# Patient Record
Sex: FEMALE | Race: WHITE | Employment: FULL TIME
[De-identification: names, ages, dates, MRNs, and addresses within clinical notes are randomized per-mention and may not be internally consistent; named-entity substitution may affect disease eponyms.]

---

## 2018-07-25 ENCOUNTER — RECORDS - HEALTHEAST (OUTPATIENT)
Dept: ADMINISTRATIVE | Facility: OTHER | Age: 43
End: 2018-07-25

## 2019-01-08 ENCOUNTER — RECORDS - HEALTHEAST (OUTPATIENT)
Dept: ADMINISTRATIVE | Facility: OTHER | Age: 44
End: 2019-01-08

## 2019-01-31 ENCOUNTER — RECORDS - HEALTHEAST (OUTPATIENT)
Dept: ADMINISTRATIVE | Facility: OTHER | Age: 44
End: 2019-01-31

## 2019-03-07 ENCOUNTER — RECORDS - HEALTHEAST (OUTPATIENT)
Dept: ADMINISTRATIVE | Facility: OTHER | Age: 44
End: 2019-03-07

## 2019-04-18 ENCOUNTER — RECORDS - HEALTHEAST (OUTPATIENT)
Dept: ADMINISTRATIVE | Facility: OTHER | Age: 44
End: 2019-04-18

## 2019-06-13 ENCOUNTER — RECORDS - HEALTHEAST (OUTPATIENT)
Dept: ADMINISTRATIVE | Facility: OTHER | Age: 44
End: 2019-06-13

## 2021-09-15 ENCOUNTER — APPOINTMENT (OUTPATIENT)
Dept: CT IMAGING | Facility: CLINIC | Age: 46
End: 2021-09-15
Attending: EMERGENCY MEDICINE
Payer: COMMERCIAL

## 2021-09-15 ENCOUNTER — HOSPITAL ENCOUNTER (OUTPATIENT)
Facility: CLINIC | Age: 46
Setting detail: OBSERVATION
Discharge: HOME | End: 2021-09-19
Attending: EMERGENCY MEDICINE | Admitting: STUDENT IN AN ORGANIZED HEALTH CARE EDUCATION/TRAINING PROGRAM
Payer: COMMERCIAL

## 2021-09-15 ENCOUNTER — APPOINTMENT (OUTPATIENT)
Dept: GENERAL RADIOLOGY | Facility: CLINIC | Age: 46
End: 2021-09-15
Attending: EMERGENCY MEDICINE
Payer: COMMERCIAL

## 2021-09-15 DIAGNOSIS — E87.20 LACTIC ACIDOSIS: ICD-10-CM

## 2021-09-15 DIAGNOSIS — R45.1 AGITATION: ICD-10-CM

## 2021-09-15 DIAGNOSIS — F29 PSYCHOSIS, UNSPECIFIED PSYCHOSIS TYPE (H): ICD-10-CM

## 2021-09-15 DIAGNOSIS — I25.9 MYOCARDIAL ISCHEMIA: ICD-10-CM

## 2021-09-15 DIAGNOSIS — E87.20 METABOLIC ACIDOSIS: ICD-10-CM

## 2021-09-15 DIAGNOSIS — R00.1 SINUS BRADYCARDIA: ICD-10-CM

## 2021-09-15 DIAGNOSIS — K72.00 SHOCK LIVER: Primary | ICD-10-CM

## 2021-09-15 LAB
ALBUMIN SERPL-MCNC: 4.1 G/DL (ref 3.4–5)
ALP SERPL-CCNC: 113 U/L (ref 40–150)
ALT SERPL W P-5'-P-CCNC: 79 U/L (ref 0–50)
AMPHETAMINES UR QL SCN: ABNORMAL
ANION GAP SERPL CALCULATED.3IONS-SCNC: 22 MMOL/L (ref 3–14)
AST SERPL W P-5'-P-CCNC: 142 U/L (ref 0–45)
ATRIAL RATE - MUSE: 137 BPM
ATRIAL RATE - MUSE: 91 BPM
BARBITURATES UR QL: ABNORMAL
BASOPHILS # BLD AUTO: 0.1 10E3/UL (ref 0–0.2)
BASOPHILS NFR BLD AUTO: 1 %
BENZODIAZ UR QL: ABNORMAL
BILIRUB SERPL-MCNC: 0.6 MG/DL (ref 0.2–1.3)
BUN SERPL-MCNC: 27 MG/DL (ref 7–30)
CALCIUM SERPL-MCNC: 9.5 MG/DL (ref 8.5–10.1)
CANNABINOIDS UR QL SCN: ABNORMAL
CHLORIDE BLD-SCNC: 108 MMOL/L (ref 94–109)
CK SERPL-CCNC: 106 U/L (ref 30–225)
CO2 SERPL-SCNC: 14 MMOL/L (ref 20–32)
COCAINE UR QL: ABNORMAL
CREAT SERPL-MCNC: 1.33 MG/DL (ref 0.52–1.04)
DIASTOLIC BLOOD PRESSURE - MUSE: NORMAL MMHG
DIASTOLIC BLOOD PRESSURE - MUSE: NORMAL MMHG
EOSINOPHIL # BLD AUTO: 0.1 10E3/UL (ref 0–0.7)
EOSINOPHIL NFR BLD AUTO: 1 %
ERYTHROCYTE [DISTWIDTH] IN BLOOD BY AUTOMATED COUNT: 13.6 % (ref 10–15)
ETHANOL SERPL-MCNC: <0.01 G/DL
GFR SERPL CREATININE-BSD FRML MDRD: 48 ML/MIN/1.73M2
GLUCOSE BLD-MCNC: 164 MG/DL (ref 70–99)
HCG SERPL QL: NEGATIVE
HCO3 BLDV-SCNC: 24 MMOL/L (ref 21–28)
HCT VFR BLD AUTO: 44.9 % (ref 35–47)
HGB BLD-MCNC: 14.4 G/DL (ref 11.7–15.7)
HOLD SPECIMEN: NORMAL
IMM GRANULOCYTES # BLD: 0.1 10E3/UL
IMM GRANULOCYTES NFR BLD: 1 %
INTERPRETATION ECG - MUSE: NORMAL
INTERPRETATION ECG - MUSE: NORMAL
LACTATE BLD-SCNC: 2.6 MMOL/L
LYMPHOCYTES # BLD AUTO: 4.3 10E3/UL (ref 0.8–5.3)
LYMPHOCYTES NFR BLD AUTO: 53 %
MAGNESIUM SERPL-MCNC: 2 MG/DL (ref 1.6–2.3)
MCH RBC QN AUTO: 32.9 PG (ref 26.5–33)
MCHC RBC AUTO-ENTMCNC: 32.1 G/DL (ref 31.5–36.5)
MCV RBC AUTO: 103 FL (ref 78–100)
MONOCYTES # BLD AUTO: 0.5 10E3/UL (ref 0–1.3)
MONOCYTES NFR BLD AUTO: 6 %
NEUTROPHILS # BLD AUTO: 3.1 10E3/UL (ref 1.6–8.3)
NEUTROPHILS NFR BLD AUTO: 38 %
NRBC # BLD AUTO: 0 10E3/UL
NRBC BLD AUTO-RTO: 0 /100
OPIATES UR QL SCN: ABNORMAL
P AXIS - MUSE: 61 DEGREES
P AXIS - MUSE: NORMAL DEGREES
PCO2 BLDV: 43 MM HG (ref 40–50)
PCP UR QL SCN: ABNORMAL
PH BLDV: 7.34 [PH] (ref 7.32–7.43)
PLATELET # BLD AUTO: 234 10E3/UL (ref 150–450)
PO2 BLDV: 39 MM HG (ref 25–47)
POTASSIUM BLD-SCNC: 3.5 MMOL/L (ref 3.4–5.3)
PR INTERVAL - MUSE: 120 MS
PR INTERVAL - MUSE: 168 MS
PROT SERPL-MCNC: 7.9 G/DL (ref 6.8–8.8)
QRS DURATION - MUSE: 88 MS
QRS DURATION - MUSE: 96 MS
QT - MUSE: 364 MS
QT - MUSE: 364 MS
QTC - MUSE: 447 MS
QTC - MUSE: 549 MS
R AXIS - MUSE: 67 DEGREES
R AXIS - MUSE: 84 DEGREES
RBC # BLD AUTO: 4.38 10E6/UL (ref 3.8–5.2)
SAO2 % BLDV: 70 % (ref 94–100)
SARS-COV-2 RNA RESP QL NAA+PROBE: NEGATIVE
SODIUM SERPL-SCNC: 144 MMOL/L (ref 133–144)
SYSTOLIC BLOOD PRESSURE - MUSE: NORMAL MMHG
SYSTOLIC BLOOD PRESSURE - MUSE: NORMAL MMHG
T AXIS - MUSE: 33 DEGREES
T AXIS - MUSE: 77 DEGREES
T4 FREE SERPL-MCNC: 0.71 NG/DL (ref 0.76–1.46)
TROPONIN I SERPL-MCNC: 0.17 UG/L (ref 0–0.04)
TSH SERPL DL<=0.005 MIU/L-ACNC: 28.28 MU/L (ref 0.4–4)
VENTRICULAR RATE- MUSE: 137 BPM
VENTRICULAR RATE- MUSE: 91 BPM
WBC # BLD AUTO: 8.2 10E3/UL (ref 4–11)

## 2021-09-15 PROCEDURE — 36415 COLL VENOUS BLD VENIPUNCTURE: CPT | Performed by: EMERGENCY MEDICINE

## 2021-09-15 PROCEDURE — C9803 HOPD COVID-19 SPEC COLLECT: HCPCS

## 2021-09-15 PROCEDURE — 93005 ELECTROCARDIOGRAM TRACING: CPT | Mod: 76

## 2021-09-15 PROCEDURE — 82040 ASSAY OF SERUM ALBUMIN: CPT | Performed by: EMERGENCY MEDICINE

## 2021-09-15 PROCEDURE — 83735 ASSAY OF MAGNESIUM: CPT | Performed by: EMERGENCY MEDICINE

## 2021-09-15 PROCEDURE — 87635 SARS-COV-2 COVID-19 AMP PRB: CPT | Performed by: EMERGENCY MEDICINE

## 2021-09-15 PROCEDURE — 80307 DRUG TEST PRSMV CHEM ANLYZR: CPT | Performed by: EMERGENCY MEDICINE

## 2021-09-15 PROCEDURE — 250N000009 HC RX 250

## 2021-09-15 PROCEDURE — 84443 ASSAY THYROID STIM HORMONE: CPT | Performed by: EMERGENCY MEDICINE

## 2021-09-15 PROCEDURE — 82077 ASSAY SPEC XCP UR&BREATH IA: CPT | Performed by: EMERGENCY MEDICINE

## 2021-09-15 PROCEDURE — 250N000013 HC RX MED GY IP 250 OP 250 PS 637: Performed by: EMERGENCY MEDICINE

## 2021-09-15 PROCEDURE — 70450 CT HEAD/BRAIN W/O DYE: CPT

## 2021-09-15 PROCEDURE — 258N000003 HC RX IP 258 OP 636: Performed by: EMERGENCY MEDICINE

## 2021-09-15 PROCEDURE — 99285 EMERGENCY DEPT VISIT HI MDM: CPT | Mod: 25

## 2021-09-15 PROCEDURE — 250N000011 HC RX IP 250 OP 636: Performed by: EMERGENCY MEDICINE

## 2021-09-15 PROCEDURE — 71045 X-RAY EXAM CHEST 1 VIEW: CPT

## 2021-09-15 PROCEDURE — 96372 THER/PROPH/DIAG INJ SC/IM: CPT

## 2021-09-15 PROCEDURE — 84484 ASSAY OF TROPONIN QUANT: CPT | Performed by: EMERGENCY MEDICINE

## 2021-09-15 PROCEDURE — 84439 ASSAY OF FREE THYROXINE: CPT | Performed by: EMERGENCY MEDICINE

## 2021-09-15 PROCEDURE — 250N000011 HC RX IP 250 OP 636

## 2021-09-15 PROCEDURE — 82803 BLOOD GASES ANY COMBINATION: CPT

## 2021-09-15 PROCEDURE — 84703 CHORIONIC GONADOTROPIN ASSAY: CPT | Performed by: EMERGENCY MEDICINE

## 2021-09-15 PROCEDURE — 85025 COMPLETE CBC W/AUTO DIFF WBC: CPT | Performed by: EMERGENCY MEDICINE

## 2021-09-15 PROCEDURE — 96361 HYDRATE IV INFUSION ADD-ON: CPT

## 2021-09-15 PROCEDURE — 82550 ASSAY OF CK (CPK): CPT | Performed by: EMERGENCY MEDICINE

## 2021-09-15 PROCEDURE — G0378 HOSPITAL OBSERVATION PER HR: HCPCS

## 2021-09-15 PROCEDURE — 93005 ELECTROCARDIOGRAM TRACING: CPT

## 2021-09-15 PROCEDURE — 96374 THER/PROPH/DIAG INJ IV PUSH: CPT

## 2021-09-15 PROCEDURE — 96375 TX/PRO/DX INJ NEW DRUG ADDON: CPT

## 2021-09-15 RX ORDER — WATER 10 ML/10ML
INJECTION INTRAMUSCULAR; INTRAVENOUS; SUBCUTANEOUS
Status: COMPLETED
Start: 2021-09-15 | End: 2021-09-15

## 2021-09-15 RX ORDER — OLANZAPINE 10 MG/2ML
INJECTION, POWDER, FOR SOLUTION INTRAMUSCULAR
Status: COMPLETED
Start: 2021-09-15 | End: 2021-09-15

## 2021-09-15 RX ORDER — OLANZAPINE 10 MG/2ML
10 INJECTION, POWDER, FOR SOLUTION INTRAMUSCULAR DAILY PRN
Status: DISCONTINUED | OUTPATIENT
Start: 2021-09-15 | End: 2021-09-19 | Stop reason: HOSPADM

## 2021-09-15 RX ORDER — LOSARTAN POTASSIUM 25 MG/1
25 TABLET ORAL DAILY
COMMUNITY
End: 2021-10-18

## 2021-09-15 RX ORDER — SODIUM CHLORIDE 9 MG/ML
INJECTION, SOLUTION INTRAVENOUS CONTINUOUS
Status: DISCONTINUED | OUTPATIENT
Start: 2021-09-15 | End: 2021-09-17

## 2021-09-15 RX ORDER — ASPIRIN 81 MG/1
324 TABLET, CHEWABLE ORAL ONCE
Status: COMPLETED | OUTPATIENT
Start: 2021-09-15 | End: 2021-09-15

## 2021-09-15 RX ORDER — LEVOTHYROXINE SODIUM 75 UG/1
75 TABLET ORAL DAILY
COMMUNITY

## 2021-09-15 RX ORDER — PROGESTERONE 100 MG/1
100 CAPSULE ORAL EVERY EVENING
COMMUNITY

## 2021-09-15 RX ORDER — ONDANSETRON 2 MG/ML
4 INJECTION INTRAMUSCULAR; INTRAVENOUS ONCE
Status: COMPLETED | OUTPATIENT
Start: 2021-09-15 | End: 2021-09-15

## 2021-09-15 RX ORDER — MAGNESIUM SULFATE HEPTAHYDRATE 40 MG/ML
2 INJECTION, SOLUTION INTRAVENOUS ONCE
Status: DISCONTINUED | OUTPATIENT
Start: 2021-09-15 | End: 2021-09-17

## 2021-09-15 RX ORDER — CHLORAL HYDRATE 500 MG
2 CAPSULE ORAL DAILY
COMMUNITY

## 2021-09-15 RX ADMIN — OLANZAPINE 10 MG: 10 INJECTION, POWDER, FOR SOLUTION INTRAMUSCULAR at 20:22

## 2021-09-15 RX ADMIN — SODIUM CHLORIDE 1000 ML: 9 INJECTION, SOLUTION INTRAVENOUS at 21:02

## 2021-09-15 RX ADMIN — SODIUM CHLORIDE 1000 ML: 9 INJECTION, SOLUTION INTRAVENOUS at 20:36

## 2021-09-15 RX ADMIN — MIDAZOLAM HYDROCHLORIDE 2 MG: 1 INJECTION, SOLUTION INTRAMUSCULAR; INTRAVENOUS at 20:25

## 2021-09-15 RX ADMIN — ONDANSETRON 4 MG: 2 INJECTION INTRAMUSCULAR; INTRAVENOUS at 23:50

## 2021-09-15 RX ADMIN — ASPIRIN 81 MG CHEWABLE TABLET 324 MG: 81 TABLET CHEWABLE at 23:46

## 2021-09-15 RX ADMIN — WATER 10 ML: 1 INJECTION INTRAMUSCULAR; INTRAVENOUS; SUBCUTANEOUS at 20:24

## 2021-09-15 ASSESSMENT — MIFFLIN-ST. JEOR: SCORE: 1230.56

## 2021-09-16 ENCOUNTER — APPOINTMENT (OUTPATIENT)
Dept: CARDIOLOGY | Facility: CLINIC | Age: 46
End: 2021-09-16
Attending: INTERNAL MEDICINE
Payer: COMMERCIAL

## 2021-09-16 PROBLEM — E34.9 HORMONE IMBALANCE: Status: ACTIVE | Noted: 2018-03-08

## 2021-09-16 LAB
ANION GAP SERPL CALCULATED.3IONS-SCNC: 9 MMOL/L (ref 3–14)
BUN SERPL-MCNC: 26 MG/DL (ref 7–30)
CALCIUM SERPL-MCNC: 7.6 MG/DL (ref 8.5–10.1)
CHLORIDE BLD-SCNC: 111 MMOL/L (ref 94–109)
CO2 SERPL-SCNC: 21 MMOL/L (ref 20–32)
CREAT SERPL-MCNC: 1.25 MG/DL (ref 0.52–1.04)
ERYTHROCYTE [DISTWIDTH] IN BLOOD BY AUTOMATED COUNT: 13.9 % (ref 10–15)
GFR SERPL CREATININE-BSD FRML MDRD: 52 ML/MIN/1.73M2
GLUCOSE BLD-MCNC: 80 MG/DL (ref 70–99)
HCT VFR BLD AUTO: 37.8 % (ref 35–47)
HGB BLD-MCNC: 12.9 G/DL (ref 11.7–15.7)
LVEF ECHO: NORMAL
MCH RBC QN AUTO: 33 PG (ref 26.5–33)
MCHC RBC AUTO-ENTMCNC: 34.1 G/DL (ref 31.5–36.5)
MCV RBC AUTO: 97 FL (ref 78–100)
PLATELET # BLD AUTO: 151 10E3/UL (ref 150–450)
POTASSIUM BLD-SCNC: 3.4 MMOL/L (ref 3.4–5.3)
RBC # BLD AUTO: 3.91 10E6/UL (ref 3.8–5.2)
SODIUM SERPL-SCNC: 141 MMOL/L (ref 133–144)
TROPONIN I SERPL-MCNC: 0.38 UG/L (ref 0–0.04)
TROPONIN I SERPL-MCNC: 0.51 UG/L (ref 0–0.04)
WBC # BLD AUTO: 11.6 10E3/UL (ref 4–11)

## 2021-09-16 PROCEDURE — 36415 COLL VENOUS BLD VENIPUNCTURE: CPT | Performed by: STUDENT IN AN ORGANIZED HEALTH CARE EDUCATION/TRAINING PROGRAM

## 2021-09-16 PROCEDURE — 93306 TTE W/DOPPLER COMPLETE: CPT

## 2021-09-16 PROCEDURE — 36415 COLL VENOUS BLD VENIPUNCTURE: CPT | Performed by: INTERNAL MEDICINE

## 2021-09-16 PROCEDURE — 85027 COMPLETE CBC AUTOMATED: CPT | Performed by: STUDENT IN AN ORGANIZED HEALTH CARE EDUCATION/TRAINING PROGRAM

## 2021-09-16 PROCEDURE — G0378 HOSPITAL OBSERVATION PER HR: HCPCS

## 2021-09-16 PROCEDURE — 84484 ASSAY OF TROPONIN QUANT: CPT | Performed by: STUDENT IN AN ORGANIZED HEALTH CARE EDUCATION/TRAINING PROGRAM

## 2021-09-16 PROCEDURE — 80048 BASIC METABOLIC PNL TOTAL CA: CPT | Performed by: STUDENT IN AN ORGANIZED HEALTH CARE EDUCATION/TRAINING PROGRAM

## 2021-09-16 PROCEDURE — 99204 OFFICE O/P NEW MOD 45 MIN: CPT | Mod: 25 | Performed by: INTERNAL MEDICINE

## 2021-09-16 PROCEDURE — 84484 ASSAY OF TROPONIN QUANT: CPT | Performed by: INTERNAL MEDICINE

## 2021-09-16 PROCEDURE — 96361 HYDRATE IV INFUSION ADD-ON: CPT

## 2021-09-16 PROCEDURE — 82248 BILIRUBIN DIRECT: CPT | Performed by: INTERNAL MEDICINE

## 2021-09-16 PROCEDURE — 99233 SBSQ HOSP IP/OBS HIGH 50: CPT | Performed by: INTERNAL MEDICINE

## 2021-09-16 PROCEDURE — 258N000003 HC RX IP 258 OP 636: Performed by: STUDENT IN AN ORGANIZED HEALTH CARE EDUCATION/TRAINING PROGRAM

## 2021-09-16 PROCEDURE — 93306 TTE W/DOPPLER COMPLETE: CPT | Mod: 26 | Performed by: INTERNAL MEDICINE

## 2021-09-16 PROCEDURE — 250N000013 HC RX MED GY IP 250 OP 250 PS 637: Performed by: STUDENT IN AN ORGANIZED HEALTH CARE EDUCATION/TRAINING PROGRAM

## 2021-09-16 PROCEDURE — 99220 PR INITIAL OBSERVATION CARE,LEVEL III: CPT | Performed by: STUDENT IN AN ORGANIZED HEALTH CARE EDUCATION/TRAINING PROGRAM

## 2021-09-16 RX ORDER — LEVOTHYROXINE SODIUM 75 UG/1
75 TABLET ORAL DAILY
Status: DISCONTINUED | OUTPATIENT
Start: 2021-09-16 | End: 2021-09-19 | Stop reason: HOSPADM

## 2021-09-16 RX ORDER — LIDOCAINE 40 MG/G
CREAM TOPICAL
Status: DISCONTINUED | OUTPATIENT
Start: 2021-09-16 | End: 2021-09-19 | Stop reason: HOSPADM

## 2021-09-16 RX ORDER — ONDANSETRON 4 MG/1
4 TABLET, ORALLY DISINTEGRATING ORAL EVERY 6 HOURS PRN
Status: DISCONTINUED | OUTPATIENT
Start: 2021-09-16 | End: 2021-09-19 | Stop reason: HOSPADM

## 2021-09-16 RX ORDER — QUETIAPINE FUMARATE 25 MG/1
25 TABLET, FILM COATED ORAL 2 TIMES DAILY PRN
Status: DISCONTINUED | OUTPATIENT
Start: 2021-09-16 | End: 2021-09-19 | Stop reason: HOSPADM

## 2021-09-16 RX ORDER — ONDANSETRON 2 MG/ML
4 INJECTION INTRAMUSCULAR; INTRAVENOUS EVERY 6 HOURS PRN
Status: DISCONTINUED | OUTPATIENT
Start: 2021-09-16 | End: 2021-09-19 | Stop reason: HOSPADM

## 2021-09-16 RX ORDER — SODIUM CHLORIDE, SODIUM LACTATE, POTASSIUM CHLORIDE, CALCIUM CHLORIDE 600; 310; 30; 20 MG/100ML; MG/100ML; MG/100ML; MG/100ML
INJECTION, SOLUTION INTRAVENOUS CONTINUOUS
Status: DISCONTINUED | OUTPATIENT
Start: 2021-09-16 | End: 2021-09-19

## 2021-09-16 RX ADMIN — SODIUM CHLORIDE, POTASSIUM CHLORIDE, SODIUM LACTATE AND CALCIUM CHLORIDE: 600; 310; 30; 20 INJECTION, SOLUTION INTRAVENOUS at 10:45

## 2021-09-16 RX ADMIN — LEVOTHYROXINE SODIUM 75 MCG: 75 TABLET ORAL at 12:42

## 2021-09-16 RX ADMIN — SODIUM CHLORIDE, POTASSIUM CHLORIDE, SODIUM LACTATE AND CALCIUM CHLORIDE: 600; 310; 30; 20 INJECTION, SOLUTION INTRAVENOUS at 20:39

## 2021-09-16 ASSESSMENT — MIFFLIN-ST. JEOR: SCORE: 1257.77

## 2021-09-16 NOTE — PHARMACY-ADMISSION MEDICATION HISTORY
Pharmacy Medication History  Admission medication history interview status for the 9/15/2021  admission is complete. See EPIC admission navigator for prior to admission medications     Location of Interview: Patient room  Medication history sources: Patient and Surescripts    Significant changes made to the medication list:  1) List was fully updated    Medication reconciliation completed by provider prior to medication history? No    Time spent in this activity: 10 minutes    Prior to Admission medications    Medication Sig Last Dose Taking? Auth Provider   calcium-magnesium (CALMAG) 500-250 MG TABS per tablet Take 1 tablet by mouth every evening 9/14/2021 at pm Yes Unknown, Entered By History   fish oil-omega-3 fatty acids 1000 MG capsule Take 2 g by mouth daily 9/15/2021 at am Yes Unknown, Entered By History   levothyroxine (SYNTHROID/LEVOTHROID) 75 MCG tablet Take 75 mcg by mouth daily 9/15/2021 at Unknown time Yes Unknown, Entered By History   progesterone (PROMETRIUM) 100 MG capsule Take 100 mg by mouth every evening 9/14/2021 at pm Yes Unknown, Entered By History   Salicylic Acid (COMPOUND W EX) Compounded testosterone 4g behind the knees every morning. 9/15/2021 at am Yes Unknown, Entered By History   vitamin B complex with vitamin C (VITAMIN  B COMPLEX) tablet Take 1 tablet by mouth daily 9/15/2021 at am Yes Unknown, Entered By History   losartan (COZAAR) 25 MG tablet Take 25 mg by mouth daily has not started yet  Unknown, Entered By History       The information provided in this note is only as accurate as the sources available at the time of update(s)

## 2021-09-16 NOTE — CONSULTS
"CARDIOLOGY CONSULT    REASON FOR CONSULT: elevated troponin    PRIMARY CARE PHYSICIAN:  [facility]    HISTORY OF PRESENT ILLNESS:  Ms. Palacios is a 46 y/o woman with PMH significant for Hashimotos thyroiditis, asthma who was admitted following an episode of altered mental status and collapse at the end of a 4 mile trail run.  Suzna states she exercises regularly (she is a  and ) and enjoys running marathons.  Yesterday, she ran a 4 mile trail race at 6 pm.  She states she felt well throughout the day and felt she was eating and drinking per her usual.  Towards the end of her race, she was noted to be stumbling and talking \"gibberish\".  She was helped by two other men at the race to carry her although she was disappointed that they did not help her across the finish line.  She states she has no memory of running towards the finish line and the first thing she recalls is being inside the ambulance. It is not clear that she actually lost consciousness, she did not fall.  She was then calm in the rig, however, upon arriving to the ED she reportedly was screaming things that were nonsensical and she required zyprexa, versed and restrains.  She was initially very hypertensive which improved without intervention.  Suzan states she recalls verbalizing in the ED as she felt like she was dying and saw a light.  It was a frightening experience for her.    In the ED, ECG was unremarkable.  Renal function demonstrated some mild KYLAH, lactate mildly elevated.  Troponin was elevated to 0.5 and downtrending.  There has been no arrhythmias on telemetry.  Suzan reports feeling well currently.  She notes increased stress in her life as her mother  of pancreatic cancer in July and she and her sisters are facing some financial issues related to this.  She admits to drinking 4-6 vodka drinks nightly and has been doing this \"for a long time\", not changed or increased recently.  "         PAST MEDICAL HISTORY:  1.  Hashitmoto's thyroiditis  2.  IBS  3.  Asthma      MEDICATIONS:  Current Facility-Administered Medications   Medication     lactated ringers infusion     levothyroxine (SYNTHROID/LEVOTHROID) tablet 75 mcg     lidocaine (LMX4) cream     lidocaine 1 % 0.1-1 mL     magnesium sulfate 2 g in water intermittent infusion     melatonin tablet 1 mg     OLANZapine (zyPREXA) injection 10 mg     ondansetron (ZOFRAN-ODT) ODT tab 4 mg    Or     ondansetron (ZOFRAN) injection 4 mg     QUEtiapine (SEROquel) tablet 25 mg     sodium chloride (PF) 0.9% PF flush 3 mL     sodium chloride (PF) 0.9% PF flush 3 mL     sodium chloride 0.9% infusion       ALLERGIES:  Allergies   Allergen Reactions     Flu Virus Vaccine Anaphylaxis     Contrast Dye      PN: LW CM1: CONTRAST- nka Reaction :     Other Environmental Allergy      PN: LW Other1: -nka       SOCIAL HISTORY:  Works as a /.  Nonsmoker.  Drinks 4-6 vodkas nightly.        FAMILY HISTORY:  I have reviewed this patient's family history and updated it with pertinent information if needed.   Family History   Problem Relation Age of Onset     No Known Problems Mother      No Known Problems Father        REVIEW OF SYSTEMS:  A complete ROS was obtained and the pertinent positives are outlined in the history of present illness above. The remainder of systems is negative.      PHYSICAL EXAM:  Temp: 97.7  F (36.5  C) Temp src: Oral BP: 122/69 Pulse: 54   Resp: 20 SpO2: 100 % O2 Device: None (Room air) Oxygen Delivery: 10 LPM  Vital Signs with Ranges  Temp:  [97.7  F (36.5  C)-98.5  F (36.9  C)] 97.7  F (36.5  C)  Pulse:  [] 54  Resp:  [11-42] 20  BP: ()/() 122/69  SpO2:  [92 %-100 %] 100 %  136 lbs 0 oz    Constitutional: awake, alert, no distress  Eyes: PERRL, sclera nonicteric  ENT: trachea midline  Respiratory: CTAB  Cardiovascular: RRR no m/r/g, no JVD  GI: nondistended, nontender, bowel sounds  present  Lymph/Hematologic: no lymphadenopathy  Skin: dry, no rash  Musculoskeletal: good muscle tone, no edema bilaterally  Neurologic: no focal deficits  Neuropsychiatric: appropriate affact    DATA:  ECG: Sinus rhythm, no significant ST segment changes    Labs:  Troponin 0.165-0.508 and downtrending  Creatinine 1.33           Echocardiogram 9/16/21   The visual ejection fraction is 55-60%.  Left ventricular systolic function is normal.  The study was technically difficult.        ASSESSMENT:  1.  Presyncope/syncope:  Not clearly evident that she had evans syncope.  Suspect this is related to dehydration as supported by lab work.  ECG and telemetry unremarkable to suggest underlying arrhythmia.    2.  Troponin elevation:  Likely demand ischemia in the setting of hemodynamic stress  3.  Alcohol abuse:  Drinking 4-6 vodka beverages nightly  4.  Increased life stressors    RECOMMENDATIONS:  1.  Agree with IV fluids  2.  Continue to monitor on telemetry overnight.    3.  Plan for exercise stress echocardiogram given presyncope/syncope with exercise to exclude underlying coronary artery disease  4.  If no significant findings, anticipate discharge tomorrow with outpatient event monitor.  5.  Strongly encouraged patient to cut back/refrain from ETOH.     Dione Zhong MD [facility]  Cardiology - [facility]  September 16, 2021

## 2021-09-16 NOTE — ED NOTES
A code 21 was called as this patient suddenly became very agitated and started screaming. Patient was not redirectable, had pressured speech, was tachycardic (160s), and did not respond to family presence and intervention. Zyprexa and Midazolam administration

## 2021-09-16 NOTE — ED NOTES
"[facility]  ED Nurse Handoff Report    ED Chief complaint: Syncope      ED Diagnosis:   Final diagnoses:   Psychosis, unspecified psychosis type (H)   Myocardial ischemia   Agitation   Metabolic acidosis   Lactic acidosis       Code Status: Full Code    Allergies: Not on File    Patient Story: Pt arrives after witnessed syncopal event during race. Upon arrival pt suddenly became violent, screaming \"fucking tell me not to die, I saw the mother fucking light and its true!\" amongst other illogical statements. Pt initailly restrained, given 10mg IM Zyprexa, 2mg IV Versed. Restraints removed  Focused Assessment:    Abnormal Labs Reviewed   COMPREHENSIVE METABOLIC PANEL - Abnormal; Notable for the following components:       Result Value    Carbon Dioxide (CO2) 14 (*)     Anion Gap 22 (*)     Creatinine 1.33 (*)     Glucose 164 (*)      (*)     ALT 79 (*)     GFR Estimate 48 (*)     All other components within normal limits   TROPONIN I - Abnormal; Notable for the following components:    Troponin I 0.165 (*)     All other components within normal limits   TSH WITH FREE T4 REFLEX - Abnormal; Notable for the following components:    TSH 28.28 (*)     All other components within normal limits   CBC WITH PLATELETS AND DIFFERENTIAL - Abnormal; Notable for the following components:     (*)     Absolute Immature Granulocytes 0.1 (*)     All other components within normal limits   T4 FREE - Abnormal; Notable for the following components:    Free T4 0.71 (*)     All other components within normal limits   DRUG ABUSE SCREEN 77 URINE (FL, RH, SH) - Abnormal; Notable for the following components:    Benzodiazepines Urine Screen Positive (*)     All other components within normal limits   ISTAT GASES LACTATE VENOUS POCT - Abnormal; Notable for the following components:    Lactic Acid POCT 2.6 (*)     O2 Sat, Venous POCT 70 (*)     All other components within normal limits     Head CT w/o contrast "   Final Result   IMPRESSION:   1.  Normal head CT.      XR Chest Port 1 View    (Results Pending)       Treatments and/or interventions provided: Zyprexa, Versed, Asprin, IVF  Patient's response to treatments and/or interventions: Calmer    To be done/followed up on inpatient unit:  See inpatient orders    Does this patient have any cognitive concerns?: A&O    Activity level - Baseline/Home:  Independent  Activity Level - Current:   Stand with assist x2    Patient's Preferred language: English   Needed?: No    Isolation: None  Infection: Not Applicable  Patient tested for COVID 19 prior to admission: YES  Bariatric?: No    Vital Signs:   Vitals:    09/15/21 2330 09/15/21 2345 09/16/21 0000 09/16/21 0015   BP: 114/67 115/77 114/74 115/70   Pulse: 91 97 98 92   Resp:  28 11 24   Temp:       TempSrc:       SpO2: 96% 95% 94% 94%   Weight:       Height:           Cardiac Rhythm:     Was the PSS-3 completed:   Yes  What interventions are required if any?               Family Comments:  at bedside  OBS brochure/video discussed/provided to patient/family: Yes           For the majority of the shift this patient's behavior was Green.   Behavioral interventions performed were None.    ED NURSE PHONE NUMBER: [phone]

## 2021-09-16 NOTE — PROGRESS NOTES
RECEIVING UNIT ED HANDOFF REVIEW    ED Nurse Handoff Report was reviewed by: Alyson Roman RN on September 16, 2021 at 11:15 AM

## 2021-09-16 NOTE — PLAN OF CARE
Pt A/Ox4. VSS on RA. Denies chest pain, lightheadedness, dizziness. TELE SR. Up independently.Tolerating regular diet. IV fluids infusing per orders.Trops trending down from 0.508 to 0.382. ECHO results pending. Cardiology consult following.

## 2021-09-16 NOTE — ED PROVIDER NOTES
"  History   Chief Complaint:  Syncope     The history is provided by the spouse and a relative. The history is limited by the condition of the patient.      Arlet Palacios is a 45 year old female who presents with syncope. The patients son tells us that the patient was running a four mile rail race but shortly before the finish she collapsed and was uttering \"nonsense and gibberish.\" She was then transported to the ED via EMS and was calm. Upon arriving to the ED the patient began screaming \"I saw the light, it's fucking true what they tell you, its true, its true.\" The patients  and son tells us that she is a very healthy person, exercises regularly, takes many supplements but eats minimal food throughout the day; for example, today she only ate two small pieces of chicken.     To note, the patients son was with her before the race and says that she was completely normal.     Review of Systems   Unable to perform ROS: Mental status change     Allergies:  Flu Virus vaccine    Medications:  Albuterol   Levothyroxine  Prometrium     Past Medical History:    Hormone imbalance  Hypothyroid  Dysplasia    IBS  UTI  Thyroid disease  Asthma    Past Surgical History:    C section   Laparoscopy   Tonsillectomy  [place] Lin teeth extraction   Laser Retinopexy - Bilateral     Family History:    Cataract  Diabetes  Multiple sclerosis  Thyroid disorder  Hypertension   Hypercholesterolemia    Social History:  The patient presents to the ED with her son and   The patient enjoys exercise, specifically running   The patient works at a gym   The patient endorses alcohol use     Physical Exam     Patient Vitals for the past 24 hrs:   BP Temp Temp src Pulse Resp SpO2 Height Weight   09/15/21 2130 (!) 142/81 -- -- 104 16 100 % -- --   09/15/21 2115 116/70 -- -- 84 21 100 % -- --   09/15/21 2100 118/84 -- -- 93 29 100 % -- --   09/15/21 2045 (!) 146/96 -- -- (!) 141 25 100 % -- --   09/15/21 2030 121/71 -- -- 116 (!) 42 99 % -- " "--   09/15/21 2029 118/67 98.5  F (36.9  C) Temporal 113 24 98 % 1.651 m (5' 5\") 59 kg (130 lb)   09/15/21 2025 118/67 -- -- (!) 160 -- 92 % -- --   09/15/21 2020 (!) 208/155 -- -- (!) 152 -- -- -- --   09/15/21 2015 -- -- -- -- -- 95 % -- --       Physical Exam  VS: Reviewed per above  HENT: Mucous membranes moist  EYES: sclera anicteric  CV: Rate as noted,  regular rhythm.   RESP: Effort normal. Breath sounds are normal bilaterally.  GI: no tenderness/rebound/guarding, not distended.  NEURO: Alert, moving all extremities, no clonus, psychomotor agitation  MSK: No deformity of the extremities  SKIN: Warm and dry    Emergency Department Course   ECG #1:  ECG taken at 2026, ECG read at 2033  Sinus tachycardia. Nonspecific ST abnormality. Abnormal ECG.  Rate 137 bpm. AK interval 120 ms. QRS duration 88 ms. QT/QTc 364/549 ms. P-R-T axes * 84 77.     ECG #2:  ECG taken at 2207, ECG read at 2220  Normal sinus rhythm. Incomplete right bundle branch block. Cannot rule out Anterior infarct, age undermined. Abnormal ECG. No longer tachycardic.   Rate 91 bpm. AK interval 168 ms. QRS duration 96 ms. QT/QTc 364/447 ms. P-R-T axes 61 67 33.     Imaging:    XR Chest Port 1 View   Final Result   IMPRESSION: Negative chest.      Head CT w/o contrast   Final Result   IMPRESSION:   1.  Normal head CT.            Laboratory:     CBC: WBC 8.2, HGB 14.4,    CMP: CO2 14(L); Anion Gap 22(H); Creatinine 1.33(H); Glucose 164(H); (H); ALT 79(H); GFR 48(L) o/w WNL   Troponin (Collected 2041): 0.165(H)  HCG Qualitative Pregnancy: Negative   Magnesium: 2.0  TSH with Free T4 Reflex: 28.28(H)  CK Total: 106  Alcohol Ethyl: <0.01  T4 Free: 0.71(L)  iStat Gases (Lactate) Venous, POCT: Lactic Acid 2.6(H); O2 Sat 70(L) o/w WNL  Urine Drugs of Abuse Screen: Benzodiazepines; o/w WNL   Asymptomatic COVID19 Virus PCR by nasopharyngeal swab: Negative     Emergency Department Course:    Reviewed:  I reviewed nursing notes, vitals, past " medical history and care everywhere    Assessments:  2013 I obtained history and examined the patient as noted above.   2115 I rechecked the patient and explained findings.     Consults:   2018 I spoke with Dr. Gracia, hospitalist, regarding this patient     Interventions:  2022 Zyprexa 10 mg IV  2025 Versed 2 mg IV     Disposition:  The patient was admitted to the hospital under the care of Dr. Gracia.     Impression & Plan     Medical Decision Making:  Patient presents to the ER via EMS for evaluation of altered mental state.  On arrival patient is screaming nonsensical statements and is not redirectable.  She was initially placed in four-point restraints and given IM Zyprexa and IV Versed.  Initial hypertension up to 250 systolic and heart rate up to 160 normalized after the sedatives.  Patient was given IV fluids.  CT of the head is negative.  Labs show initial metabolic acidosis, mild troponin elevation.  EKG was initially sinus tachycardia and on recheck normal sinus rhythm without ischemic change.  Chest x-ray is clear.  After period of monitoring, patient's mental status cleared.  She endorses significant stressors recently include the death of her mother and other financial issues.  She denies drug use aside from increased alcohol use recently.  She denies withdrawal type symptoms however between her drinks.  I have lower suspicion for alcohol withdrawal at this time given normal vital signs and lack of tremulousness/tongue fasciculation after patient's mental status cleared.  Ultimately patient was admitted to the hospital service to ensure her laboratory values normalized prior to psychiatric assessment.        Covid-19  Arlet Palacios was evaluated during a global COVID-19 pandemic, which necessitated consideration that the patient might be at risk for infection with the SARS-CoV-2 virus that causes COVID-19.   Applicable protocols for evaluation were followed during the patient's care.   COVID-19 was  considered as part of the patient's evaluation. The plan for testing is:  a test was obtained during this visit.    Diagnosis:    ICD-10-CM    1. Psychosis, unspecified psychosis type (H)  F29    2. Myocardial ischemia  I25.9    3. Agitation  R45.1    4. Metabolic acidosis  E87.2    5. Lactic acidosis  E87.2      Scribe Disclosure:  I, Wilfredo Escobar, am serving as a scribe at 8:17 PM on 9/15/2021 to document services personally performed by Pawan Sanchez MD, based on my observations and the provider's statements to me.            Pawan Sanchze MD  09/16/21 0048

## 2021-09-16 NOTE — ED TRIAGE NOTES
"Pt was running 4 mile run, when she had a syncopla event at end of race, did not hit head, no thinners. When asked what happemd pt responded with \"I think I know what happened, I'm dealing with a lot of stress, my mom ansd dad just \". Pt making illogical statements like \"I'm not talking, I'm going to die, I know why I am here and I just need to admit that\".  "

## 2021-09-16 NOTE — H&P
[facility]    History and Physical - Hospitalist Service       Date of Admission:  9/15/2021    Assessment & Plan      Arlet Palacios is a 45 year old female admitted on 9/15/2021. She presents with syncope.       Syncope    Type 2 Demand Myocardial ischemia    Assessment: Presents with an episode of syncope following a 4 mile race.  She denies any chest pain or shortness of breath prior to her syncope.  She does endorse feeling lightheaded over the past several days.  She is chest pain-free, and EKG on admission showed no dynamic ST changes to suggest ACS.  Troponin is mildly elevated 0.165, likely etiology.  Possibly an NSTEMI/type II demand ischemia.     Plan:   -Admit to observation  -Monitor on telemetry  -Cardiology consulted  -Given she is chest pain-free at this time we will hold off heparin  -Follow vitals/temp    Acute kidney injury  Assessment/Plan: Creatinine elevated on admission at 1.33, suspect secondary to prerenal etiology given her poor p.o. intake over the past day.  Will hydrate and recheck BMP in a.m.      Psychosis, unspecified psychosis type (H)    Agitation    Assessment/Plan: Unclear etiology as patient is now back to her baseline mental status.  She denies any history of hallucinations/suicidal ideations.      Lactic acidosis    Metabolic acidosis    Assessment/Plan: Lactic acid of 2.6 on admission, suspect is secondary to hypovolemia.  Otherwise there is no objective evidence of infection driving her acidosis.      Hypothyroidism    Assessment/Plan: TSH on admission of 20.28, will start on Synthroid follow-up as an outpatient       Diet: NPO for Medical/Clinical Reasons Except for: Ice Chips, Meds Regular Diet  DVT Prophylaxis: Low Risk/Ambulatory with no VTE prophylaxis indicated  Cleaning Catheter: Not present  Central Lines: None  Code Status:   FULL CODE    Clinically Significant Risk Factors Present on Admission                   Disposition Plan   Expected  "discharge:  tomorrow recommended to prior living arrangement once mental status at baseline.     The patient's care was discussed with the Patient and ED Provider.    Yeyo Gracia MD  [facility]  Securely message with the Vocera Web Console (learn more here)  Text page via [employer] Paging/Directory      ______________________________________________________________________    Chief Complaint     AMS  Syncope    History is obtained from the patient    History of Present Illness      Arlet Palacios is a 45 year old female with past medical history of hypothyroidism who presents for evaluation of syncope.    Patient son reports that the patient was in her usual health, and was running a 4 mile race but just before finishing, she had a syncopal episode and collapsed and afterwards appeared to be confused, speaking \"nonsensical gibberish\".  Given her syncope and altered mental status, EMS was activated for further evaluation and patient was transported to the emergency department.    Patient's  at bedside reports that she is a very healthy person, regular exercise without symptoms.  She denies any history of syncope, no history of coronary disease.  She does have a history of MIs in her family.  She denies any recent fevers or chills, no blood in her stool, no weight loss or night sweats.  She denies any new medications, no illicit drug use.  She does report that she has been eating minimal over the past day.  She otherwise denies any recent falls or head trauma.  She denies any history of mental illness/depression.  She otherwise has no other complaints at this time.    Review of Systems    The 10 point Review of Systems is negative other than noted in the HPI or here.     Past Medical History    I have reviewed this patient's medical history and updated it with pertinent information if needed.   Past Medical History:   Diagnosis Date     Hypothyroidism        Past Surgical History   I " have reviewed this patient's surgical history and updated it with pertinent information if needed.  History reviewed. No pertinent surgical history.    Social History   I have reviewed this patient's social history and updated it with pertinent information if needed.  Social History     Tobacco Use     Smoking status: None   Substance Use Topics     Alcohol use: None     Drug use: None       Family History   I have reviewed this patient's family history and updated it with pertinent information if needed.  Family History   Problem Relation Age of Onset     No Known Problems Mother      No Known Problems Father      Prior to Admission Medications   Prior to Admission Medications   Prescriptions Last Dose Informant Patient Reported? Taking?   Salicylic Acid (COMPOUND W EX) 9/15/2021 at am Self Yes Yes   Sig: Compounded testosterone 4g behind the knees every morning.   calcium-magnesium (CALMAG) 500-250 MG TABS per tablet 9/14/2021 at pm Self Yes Yes   Sig: Take 1 tablet by mouth every evening   fish oil-omega-3 fatty acids 1000 MG capsule 9/15/2021 at am Self Yes Yes   Sig: Take 2 g by mouth daily   levothyroxine (SYNTHROID/LEVOTHROID) 75 MCG tablet 9/15/2021 at Unknown time Pharmacy Yes Yes   Sig: Take 75 mcg by mouth daily   losartan (COZAAR) 25 MG tablet has not started yet Pharmacy Yes No   Sig: Take 25 mg by mouth daily   progesterone (PROMETRIUM) 100 MG capsule 9/14/2021 at pm Pharmacy Yes Yes   Sig: Take 100 mg by mouth every evening   vitamin B complex with vitamin C (VITAMIN  B COMPLEX) tablet 9/15/2021 at am Self Yes Yes   Sig: Take 1 tablet by mouth daily      Facility-Administered Medications: None     Allergies   Allergies   Allergen Reactions     Flu Virus Vaccine Anaphylaxis     Contrast Dye      PN: LW CM1: CONTRAST- nka Reaction :     Other Environmental Allergy      PN: LW Other1: -nka       Physical Exam   Vital Signs: Temp: 98.5  F (36.9  C) Temp src: Temporal BP: 100/63 Pulse: 80   Resp: 15 SpO2:  95 % O2 Device: None (Room air) Oxygen Delivery: 10 LPM  Weight: 130 lbs 0 oz    Constitutional: awake, alert, cooperative, no apparent distress.   Eyes: Lids and lashes normal, pupils equal, round and reactive to light   ENT: Normocephalic, without obvious abnormality, atraumatic, sinuses nontender on palpation   Hematologic / Lymphatic: no cervical lymphadenopathy   Respiratory: CTABL   Cardiovascular: RRR with no m/r/g   GI: Normal bowel sounds, soft, non-distended, non-tender.   Skin: normal skin color, texture, turgor   Musculoskeletal: There is no redness, warmth, or swelling of the joints. Full range of motion noted.   Neurologic: Awake, alert, oriented to name, place and time. Cranial nerves II-XII are grossly intact. Motor is 5 out of 5 bilaterally. Sensory is intact.   Neuropsychiatric: normal mood and affect    Data   Data reviewed today: I reviewed all medications, new labs and imaging results over the last 24 hours. I personally reviewed the EKG tracing showing see below, the chest x-ray image(s) showing see below and the head CT image(s) showing see below.    ECG  Sinus tachycardia. Nonspecific ST abnormality.     CT HEAD  IMPRESSION:  1.  Normal head CT.    CXR  Impression:  No acute airspace disease    Most Recent 3 CBC's:  Recent Labs   Lab Test 09/15/21  2041   WBC 8.2   HGB 14.4   *        Most Recent 3 BMP's:  Recent Labs   Lab Test 09/15/21  2041      POTASSIUM 3.5   CHLORIDE 108   CO2 14*   BUN 27   CR 1.33*   ANIONGAP 22*   CHINO 9.5   *     Most Recent 2 LFT's:  Recent Labs   Lab Test 09/15/21  2041   *   ALT 79*   ALKPHOS 113   BILITOTAL 0.6     Most Recent 3 INR's:No lab results found.  Most Recent Urinalysis:No lab results found.  Recent Results (from the past 24 hour(s))   Head CT w/o contrast    Narrative    EXAM: CT HEAD W/O CONTRAST  LOCATION: [facility]  DATE/TIME: 9/15/2021 9:50 PM    INDICATION: Psychosis  COMPARISON:  None.  TECHNIQUE: Routine CT Head without IV contrast. Multiplanar reformats. Dose reduction techniques were used.    FINDINGS:  INTRACRANIAL CONTENTS: No intracranial hemorrhage, extraaxial collection, or mass effect.  No CT evidence of acute infarct. Normal parenchymal attenuation. Normal ventricles and sulci.     VISUALIZED ORBITS/SINUSES/MASTOIDS: No intraorbital abnormality. No paranasal sinus mucosal disease. No middle ear or mastoid effusion.    BONES/SOFT TISSUES: No acute abnormality.      Impression    IMPRESSION:  1.  Normal head CT.   XR Chest Port 1 View    Narrative    EXAM: XR CHEST PORT 1 VIEW  LOCATION: [facility]  DATE/TIME: 9/15/2021 10:18 PM    INDICATION: Elevated troponin, transient hypertension, altered  COMPARISON: None.      Impression    IMPRESSION: Negative chest.

## 2021-09-16 NOTE — PROGRESS NOTES
[facility]    Medicine Progress Note - Hospitalist Service       Date of Admission:  9/15/2021    Assessment & Plan              Arlet Palacios is a 45 year old female admitted on 9/15/2021. She presents with syncope.       Syncope    Type 2 Demand Myocardial ischemia    Assessment: Presents with an episode of syncope following a 4 mile race.  She denies any chest pain or shortness of breath prior to her syncope.  She does endorse feeling lightheaded over the past several days.  She is chest pain-free, and EKG on admission showed no dynamic ST changes to suggest ACS.  Troponin is mildly elevated 0.165, likely etiology.  Possibly an NSTEMI/type II demand ischemia.     Plan:   -Admit to observation  -Monitor on telemetry  -Appreciate Cardiology review. Will get stress test tomorrow    Acute kidney injury  Assessment/Plan: Creatinine elevated on admission at 1.33, suspect secondary to prerenal etiology given her poor p.o. intake over the past day.  Ct to hydrate and recheck BMP in a.m.  Creatinine   Date Value Ref Range Status   09/16/2021 1.25 (H) 0.52 - 1.04 mg/dL Final         Psychosis, unspecified psychosis type (H)    Agitation    Assessment/Plan: Unclear etiology as patient is now back to her baseline mental status.  She denies any history of hallucinations/suicidal ideations.      Lactic acidosis    Metabolic acidosis    Assessment/Plan: Lactic acid of 2.6 on admission, suspect is secondary to hypovolemia.  Otherwise there is no objective evidence of infection driving her acidosis.      Hypothyroidism (Known to have Hashimotos)     Assessment/Plan: TSH on admission of 20.28,  Ct PTA Synthroid 75 mcg . Follow-up as an outpatient     Diet: Regular Diet Adult    DVT Prophylaxis: Ambulate every shift  Cleaning Catheter: Not present  Central Lines: None  Code Status: Full Code      Disposition Plan   Expected discharge:  tomorrow recommended to prior living arrangement once adequate pain  "management/ tolerating PO medications and cardiac stress test.     The patient's care was discussed with the Bedside Nurse, Patient, Patient's Family and Cardiology Consultant.    Ziyad Rasmussen MD, MD  Hospitalist Service  [facility]  Securely message with the Vocera Web Console (learn more here)  Text page via [employer] Paging/Directory      Clinically Significant Risk Factors Present on Admission          # Hypocalcemia: Ca = 7.6 mg/dL (Ref range: 8.5 - 10.1 mg/dL) and/or iCa = N/A on admission, will replace as needed          ______________________________________________________________________    Interval History   History reviewed  Currently Denies Chest pain/ SOB/ cough, Denies any N&V/ bowel problems  Denies urinary problems , Denies fever/chills/rigors   4 point ROS done and neg unless mentioned    Data reviewed today: I reviewed all medications, new labs and imaging results over the last 24 hours. I personally reviewed the echo image(s) showing as below.    Physical Exam   /69   Pulse 54   Temp 97.7  F (36.5  C) (Oral)   Resp 20   Ht 1.651 m (5' 5\")   Wt 61.7 kg (136 lb)   SpO2 100%   BMI 22.63 kg/m    Gen- pleasant lying in bed  HEENT- NAD, DENISE  Neck- supple, no JVD elevation, no thyromegaly  CVS- I+II+ no m/r/g  RS- CTAB  Abdo- soft, no tenderness . No g/r/r  Ext- no edema   CNS- no focal signs     Data    BMPRecent Labs   Lab 09/16/21  0728 09/15/21  2041    144   POTASSIUM 3.4 3.5   CHLORIDE 111* 108   CHINO 7.6* 9.5   CO2 21 14*   BUN 26 27   CR 1.25* 1.33*   GLC 80 164*     CBC  Recent Labs   Lab 09/16/21  0728 09/15/21  2041 09/15/21  2041   WBC 11.6*  --  8.2   RBC 3.91  --  4.38   HGB 12.9   < > 14.4   HCT 37.8  --  44.9   MCV 97  --  103*   MCH 33.0  --  32.9   MCHC 34.1  --  32.1   RDW 13.9  --  13.6     --  234    < > = values in this interval not displayed.     INRNo lab results found in last 7 days.  LFTs  Recent Labs   Lab 09/15/21  2041 "   ALKPHOS 113   *   ALT 79*   BILITOTAL 0.6   PROTTOTAL 7.9   ALBUMIN 4.1      PANCNo lab results found in last 7 days.    Recent Results (from the past 24 hour(s))   Head CT w/o contrast    Narrative    EXAM: CT HEAD W/O CONTRAST  LOCATION: [facility]  DATE/TIME: 9/15/2021 9:50 PM    INDICATION: Psychosis  COMPARISON: None.  TECHNIQUE: Routine CT Head without IV contrast. Multiplanar reformats. Dose reduction techniques were used.    FINDINGS:  INTRACRANIAL CONTENTS: No intracranial hemorrhage, extraaxial collection, or mass effect.  No CT evidence of acute infarct. Normal parenchymal attenuation. Normal ventricles and sulci.     VISUALIZED ORBITS/SINUSES/MASTOIDS: No intraorbital abnormality. No paranasal sinus mucosal disease. No middle ear or mastoid effusion.    BONES/SOFT TISSUES: No acute abnormality.      Impression    IMPRESSION:  1.  Normal head CT.   XR Chest Port 1 View    Narrative    EXAM: XR CHEST PORT 1 VIEW  LOCATION: [facility]  DATE/TIME: 9/15/2021 10:18 PM    INDICATION: Elevated troponin, transient hypertension, altered  COMPARISON: None.      Impression    IMPRESSION: Negative chest.   Echocardiogram Complete   Result Value    LVEF  55-60%    Narrative    [id number]  [id number]  [id number]  [id number]^CAMERON^TANIA     [facility]  [facility]  Echocardiography Laboratory  [address]  [place] [address]  Phone [phone]  Fax [phone]     Name: TRUONG BARR  MRN: [id number]  : 1975  Study Date: 2021 10:39 AM  Age: 45 yrs  Gender: Female  Patient Location: [facility]  Reason For Study: Chest Discomfort  Ordering Physician: TANIA BARNES  Performed By: Kristina Yip     BSA: 1.6 m2  Height: 65 in  Weight: 130 lb  HR: 57  BP: 107/69 mmHg  ______________________________________________________________________________  Procedure  Complete Echo  Adult.  ______________________________________________________________________________  Interpretation Summary     The visual ejection fraction is 55-60%.  Left ventricular systolic function is normal.  The study was technically difficult.  ______________________________________________________________________________  Left Ventricle  The left ventricle is normal in size. There is normal left ventricular wall  thickness. Diastolic Doppler findings (E/E' ratio and/or other parameters)  suggest left ventricular filling pressures are normal. Left ventricular  systolic function is normal. The visual ejection fraction is 55-60%.     Right Ventricle  The right ventricle is normal in size and function.     Atria  Normal left atrial size. Right atrial size is normal. There is no color  Doppler evidence of an atrial shunt.     Mitral Valve  There is trace mitral regurgitation.     Tricuspid Valve  There is trace tricuspid regurgitation.     Aortic Valve  The aortic valve is trileaflet. No aortic regurgitation is present. No  hemodynamically significant valvular aortic stenosis.     Pulmonic Valve  There is no pulmonic valvular regurgitation. Normal pulmonic valve velocity.     Vessels  IVC diameter <2.1 cm collapsing >50% with sniff suggests a normal RA pressure  of 3 mmHg.     Pericardium  There is no pericardial effusion.     Rhythm  The rhythm was sinus bradycardia.     ______________________________________________________________________________  MMode/2D Measurements & Calculations  IVSd: 0.64 cm  LVIDd: 5.0 cm  LVIDs: 2.7 cm  LVPWd: 0.80 cm  FS: 46.0 %  LV mass(C)d: 118.2 grams  LV mass(C)dI: 71.8 grams/m2     Ao root diam: 3.0 cm  LA dimension: 2.2 cm  asc Aorta Diam: 3.1 cm  LA/Ao: 0.74  LA Volume (BP): 12.3 ml  LA Volume Index (BP): 7.5 ml/m2  RWT: 0.32     Doppler Measurements & Calculations  MV E max kory: 72.3 cm/sec  MV A max kory: 65.0 cm/sec  MV E/A: 1.1  MV dec slope: 433.8 cm/sec2     PA acc time: 0.10  sec  E/E' av.5  Lateral E/e': 6.1  Medial E/e': 7.0     ______________________________________________________________________________  Report approved by: Elvin Marsh 2021 12:16 PM

## 2021-09-17 ENCOUNTER — APPOINTMENT (OUTPATIENT)
Dept: CARDIOLOGY | Facility: CLINIC | Age: 46
End: 2021-09-17
Attending: INTERNAL MEDICINE
Payer: COMMERCIAL

## 2021-09-17 LAB
ALBUMIN SERPL-MCNC: 3 G/DL (ref 3.4–5)
ALBUMIN SERPL-MCNC: 3 G/DL (ref 3.4–5)
ALBUMIN SERPL-MCNC: 3.2 G/DL (ref 3.4–5)
ALP SERPL-CCNC: 76 U/L (ref 40–150)
ALP SERPL-CCNC: 78 U/L (ref 40–150)
ALP SERPL-CCNC: 79 U/L (ref 40–150)
ALT SERPL W P-5'-P-CCNC: 106 U/L (ref 0–50)
ALT SERPL W P-5'-P-CCNC: 2470 U/L (ref 0–50)
ALT SERPL W P-5'-P-CCNC: 2546 U/L (ref 0–50)
ANION GAP SERPL CALCULATED.3IONS-SCNC: 4 MMOL/L (ref 3–14)
AST SERPL W P-5'-P-CCNC: 180 U/L (ref 0–45)
AST SERPL W P-5'-P-CCNC: 3928 U/L (ref 0–45)
AST SERPL W P-5'-P-CCNC: 4195 U/L (ref 0–45)
BILIRUB DIRECT SERPL-MCNC: 0.3 MG/DL (ref 0–0.2)
BILIRUB DIRECT SERPL-MCNC: 0.8 MG/DL (ref 0–0.2)
BILIRUB SERPL-MCNC: 0.8 MG/DL (ref 0.2–1.3)
BILIRUB SERPL-MCNC: 1.3 MG/DL (ref 0.2–1.3)
BILIRUB SERPL-MCNC: 1.7 MG/DL (ref 0.2–1.3)
BUN SERPL-MCNC: 16 MG/DL (ref 7–30)
CALCIUM SERPL-MCNC: 8.3 MG/DL (ref 8.5–10.1)
CHLORIDE BLD-SCNC: 111 MMOL/L (ref 94–109)
CK SERPL-CCNC: 540 U/L (ref 30–225)
CO2 SERPL-SCNC: 25 MMOL/L (ref 20–32)
CREAT SERPL-MCNC: 0.99 MG/DL (ref 0.52–1.04)
ERYTHROCYTE [DISTWIDTH] IN BLOOD BY AUTOMATED COUNT: 13.6 % (ref 10–15)
GFR SERPL CREATININE-BSD FRML MDRD: 69 ML/MIN/1.73M2
GLUCOSE BLD-MCNC: 88 MG/DL (ref 70–99)
HCT VFR BLD AUTO: 38.5 % (ref 35–47)
HGB BLD-MCNC: 12.9 G/DL (ref 11.7–15.7)
MCH RBC QN AUTO: 32.8 PG (ref 26.5–33)
MCHC RBC AUTO-ENTMCNC: 33.5 G/DL (ref 31.5–36.5)
MCV RBC AUTO: 98 FL (ref 78–100)
PLATELET # BLD AUTO: 129 10E3/UL (ref 150–450)
POTASSIUM BLD-SCNC: 3.8 MMOL/L (ref 3.4–5.3)
PROT SERPL-MCNC: 5.7 G/DL (ref 6.8–8.8)
PROT SERPL-MCNC: 5.7 G/DL (ref 6.8–8.8)
PROT SERPL-MCNC: 6.1 G/DL (ref 6.8–8.8)
RBC # BLD AUTO: 3.93 10E6/UL (ref 3.8–5.2)
SODIUM SERPL-SCNC: 140 MMOL/L (ref 133–144)
WBC # BLD AUTO: 7.7 10E3/UL (ref 4–11)

## 2021-09-17 PROCEDURE — 99213 OFFICE O/P EST LOW 20 MIN: CPT | Mod: 25 | Performed by: INTERNAL MEDICINE

## 2021-09-17 PROCEDURE — 93325 DOPPLER ECHO COLOR FLOW MAPG: CPT | Mod: 26 | Performed by: INTERNAL MEDICINE

## 2021-09-17 PROCEDURE — 93350 STRESS TTE ONLY: CPT | Mod: 26 | Performed by: INTERNAL MEDICINE

## 2021-09-17 PROCEDURE — 96361 HYDRATE IV INFUSION ADD-ON: CPT

## 2021-09-17 PROCEDURE — 82550 ASSAY OF CK (CPK): CPT | Performed by: INTERNAL MEDICINE

## 2021-09-17 PROCEDURE — 93321 DOPPLER ECHO F-UP/LMTD STD: CPT | Mod: 26 | Performed by: INTERNAL MEDICINE

## 2021-09-17 PROCEDURE — 85027 COMPLETE CBC AUTOMATED: CPT | Performed by: INTERNAL MEDICINE

## 2021-09-17 PROCEDURE — 258N000003 HC RX IP 258 OP 636: Performed by: INTERNAL MEDICINE

## 2021-09-17 PROCEDURE — 93350 STRESS TTE ONLY: CPT | Mod: TC

## 2021-09-17 PROCEDURE — 250N000013 HC RX MED GY IP 250 OP 250 PS 637: Performed by: STUDENT IN AN ORGANIZED HEALTH CARE EDUCATION/TRAINING PROGRAM

## 2021-09-17 PROCEDURE — 99233 SBSQ HOSP IP/OBS HIGH 50: CPT | Performed by: INTERNAL MEDICINE

## 2021-09-17 PROCEDURE — 93016 CV STRESS TEST SUPVJ ONLY: CPT | Performed by: INTERNAL MEDICINE

## 2021-09-17 PROCEDURE — 82248 BILIRUBIN DIRECT: CPT | Performed by: INTERNAL MEDICINE

## 2021-09-17 PROCEDURE — G0378 HOSPITAL OBSERVATION PER HR: HCPCS

## 2021-09-17 PROCEDURE — 82040 ASSAY OF SERUM ALBUMIN: CPT | Performed by: INTERNAL MEDICINE

## 2021-09-17 PROCEDURE — 36415 COLL VENOUS BLD VENIPUNCTURE: CPT | Performed by: INTERNAL MEDICINE

## 2021-09-17 PROCEDURE — 93321 DOPPLER ECHO F-UP/LMTD STD: CPT | Mod: TC

## 2021-09-17 PROCEDURE — 258N000003 HC RX IP 258 OP 636: Performed by: EMERGENCY MEDICINE

## 2021-09-17 PROCEDURE — 93018 CV STRESS TEST I&R ONLY: CPT | Performed by: INTERNAL MEDICINE

## 2021-09-17 RX ADMIN — SODIUM CHLORIDE: 9 INJECTION, SOLUTION INTRAVENOUS at 06:44

## 2021-09-17 RX ADMIN — SODIUM CHLORIDE, POTASSIUM CHLORIDE, SODIUM LACTATE AND CALCIUM CHLORIDE: 600; 310; 30; 20 INJECTION, SOLUTION INTRAVENOUS at 23:59

## 2021-09-17 RX ADMIN — LEVOTHYROXINE SODIUM 75 MCG: 75 TABLET ORAL at 07:58

## 2021-09-17 NOTE — PROGRESS NOTES
Observation goals PRIOR TO DISCHARGE    Comments:   -diagnostic tests and consults completed and resulted: Not met     -vital signs normal or at patient baseline: Partially met, slightly elevated BP     -tolerating oral intake to maintain hydration: Met     -adequate pain control on oral analgesics : Met     -returns to baseline functional status: Met     -safe disposition plan has been identified : Met     Nurse to notify provider when observation goals have been met and patient is ready for discharge.

## 2021-09-17 NOTE — PLAN OF CARE
Observation goals PRIOR TO DISCHARGE       Comments:     -Diagnostic tests and consults completed and resulted -Partially met    -Vital signs normal or at patient baseline -Met    -Tolerating oral intake to maintain hydration -Met     -Adequate pain control on oral analgesics -Met    -Returns to baseline functional status -Met    -Safe disposition plan has been identified-Met     Nurse to notify provider when observation goals have been met and patient is ready for discharge.

## 2021-09-17 NOTE — PLAN OF CARE
Observation goals PRIOR TO DISCHARGE       Comments:     -Diagnostic tests and consults completed and resulted -Not met    -Vital signs normal or at patient baseline -Met    -Tolerating oral intake to maintain hydration-Met     -Adequate pain control on oral analgesics -Met    -Returns to baseline functional status -Met    -Safe disposition plan has been identified-Met     Nurse to notify provider when observation goals have been met and patient is ready for discharge.

## 2021-09-17 NOTE — CONSULTS
Acute liver injury.Still up trending LFTS likely ischemic liver injury. Other D/D acute hepatitis  Repeat labs in AM check CK, NH4 and INR  I/V fluids  Check acute hepatitis panel    Gilberto Goldberg MD

## 2021-09-17 NOTE — UTILIZATION REVIEW
Concurrent stay review; Secondary Review Determination     [facility]          Under the authority of the Utilization Management Committee, the utilization review process indicated a secondary review on the above patient.  The review outcome is based on review of the medical records, discussions with staff, and applying clinical experience noted on the date of the review.          (x) Observation Status Appropriate - Concurrent stay review    RATIONALE FOR DETERMINATION     45 year old female admitted on 9/15/2021. She presents with syncope, Type 2 Demand Myocardial ischemia, Significant transaminitis: Likely shock liver. Exercise stress echocardiogram performed this morning which demonstrated outstanding functional capacity, normal study without evidence for ischemia.    Patient is clinically improving and there is no clear indication to change patient's status to inpatient. The severity of illness, intensity of service provided, expected LOS and risk for adverse outcome make the care appropriate for observation.      This document was produced using voice recognition software       The information on this document is developed by the utilization review team in order for the business office to ensure compliance.  This only denotes the appropriateness of proper admission status and does not reflect the quality of care rendered.         The definitions of Inpatient Status and Observation Status used in making the determination above are those provided in the CMS Coverage Manual, Chapter 1 and Chapter 6, section 70.4.      Sincerely,     SUGEY VILLALOBOS MD    System Medical Director  Utilization Management  [facility].

## 2021-09-17 NOTE — PLAN OF CARE
Pt is A+O x 4. VSS on RA, T-max 99. Up IND, amb. Tele is SB. Denied CP, SOB, dizziness. PIV is infusing. Voiding. Cardiology consulted, see note. Plan for exercise stress echo today.

## 2021-09-17 NOTE — PROGRESS NOTES
MD Notification    Notified Person: MD    Notified Person Name: Ziyad Rasmussen MD    Notification Date/Time: 9/17/21 @ 1114HRS    Notification Interaction: Pager    Purpose of Notification: Critically high AST and ALT.    Orders Received: Abd US ordered and Hepatic panel    Comments:

## 2021-09-17 NOTE — PROGRESS NOTES
A/OX4. Tele: Sinus Armond.Slightly elevated BP, other VSS on RA. Denies pain. Exercise stress ECHO done this shift, refer to results. Elevated  AST/ALT. GI consulted, yet to see pt. Pt prefers US abdomen to be done tomorrow as pt has to be NPO for 8hrs.

## 2021-09-17 NOTE — PROGRESS NOTES
Observation goals PRIOR TO DISCHARGE    Comments:   -diagnostic tests and consults completed and resulted: Not met    -vital signs normal or at patient baseline: Met    -tolerating oral intake to maintain hydration: Met    -adequate pain control on oral analgesics : Met    -returns to baseline functional status: Met    -safe disposition plan has been identified : Met    Nurse to notify provider when observation goals have been met and patient is ready for discharge.

## 2021-09-17 NOTE — PROGRESS NOTES
" [facility]    Cardiology Progress Note    Date of Service (when I saw the patient): 2021            Assessment and Plan:     ASSESSMENT:  1.  Presyncope/syncope:  Not clearly evident that she had evans syncope.  Suspect this is related to dehydration as supported by lab work.  ECG and telemetry unremarkable to suggest underlying arrhythmia.    2.  Troponin elevation:  Likely demand ischemia in the setting of hemodynamic stress.  Exercise stress echocardiogram performed this morning which demonstrated outstanding functional capacity, normal study without evidence for ischemia.    3.  Alcohol abuse:  Drinking 4-6 vodka beverages nightly.  Strongly advised to cut back or eliminate entirely   4.  Increased life stressors     RECOMMENDATIONS:  1.  Okay to discharge from a cardiac standpoint.  Plan for 7 day ZIO patch monitor for additional monitoring to exclude underlying arrhythmia.  Follow up with cardiology VIRAJ to be arranged.      Dione Zhong MD [facility] Heart        Interval History:     Feels well, back to normal.           Medications:       levothyroxine  75 mcg Oral Daily     magnesium sulfate  2 g Intravenous Once     sodium chloride (PF)  3 mL Intracatheter Q8H              Physical Exam:   Blood pressure 128/85, pulse 67, temperature 98.4  F (36.9  C), temperature source Oral, resp. rate 16, height 1.651 m (5' 5\"), weight 61.7 kg (136 lb), SpO2 100 %.  Vitals:    09/15/21 20221 1150   Weight: 59 kg (130 lb) 61.7 kg (136 lb)       Intake/Output Summary (Last 24 hours) at 2021 1340  Last data filed at 2021 0640  Gross per 24 hour   Intake 1078.33 ml   Output --   Net 1078.33 ml           Vital Sign Ranges  Temperature Temp  Av.1  F (36.7  C)  Min: 97.5  F (36.4  C)  Max: 99  F (37.2  C)   Blood pressure Systolic (24hrs), Av , Min:101 , Max:128        Diastolic (24hrs), Av, Min:66, Max:85      Pulse Pulse  Av  Min: 51  Max: " 67   Respirations Resp  Av  Min: 16  Max: 20   Pulse oximetry SpO2  Av.8 %  Min: 97 %  Max: 100 %         EXAM:    Constitutional:    in no apparent distress    Skin:    normal    Head:    Normocephalic, atramatic    Eyes:    pupils equal, round and reactive to light, extra ocular muscles intact, sclera clear, conjunctiva normal    Neck:    JVP    Lungs:    CTAB   Cardiovascular:    S1, S2 RRR no m/r/g, no JVD   Abdomen:    normal bowel sounds    Extremities and Back:    no cyanosis or clubbing and No edema bilaterally   Neurological:    No gross or focal neurologic abnormalities               Data:     Recent Labs   Lab Test 21  1008 21  0728   WBC 7.7 11.6*   HGB 12.9 12.9   MCV 98 97   * 151      Recent Labs   Lab Test 21  1008 21  0728    141   POTASSIUM 3.8 3.4   CHLORIDE 111* 111*   BUN 16 26   CR 0.99 1.25*     Recent Labs   Lab 21  1008 21  0728 09/15/21  2041   GLC 88 80 164*     Recent Labs   Lab Test 21  1008 09/15/21  2041   ALT 2,470* 79*   AST 3,928* 142*     No results found for: TROPI      Recent Labs   Lab Test 09/15/21  2041   MAG 2.0       No results found for: CHOL  No results found for: HDL  No results found for: LDL  No results found for: TRIG  No results found for: CHOLHDLRATIO       TSH   Date Value Ref Range Status   09/15/2021 28.28 (H) 0.40 - 4.00 mU/L Final       Dione Zhong MD, FACC  Cardiology

## 2021-09-17 NOTE — PLAN OF CARE
Observation goals PRIOR TO DISCHARGE       Comments:     -Diagnostic tests and consults completed and resulted -Not met    -Vital signs normal or at patient baseline -Met    -Tolerating oral intake to maintain hydration-Met     -Adequate pain control on oral analgesics -Met    -Returns to baseline functional status -Met    -Safe disposition plan has been identified-Met     Nurse to notify provider when observation goals have been met and patient is ready for discharge.      Pt is A&OX4, VSS on RA,tele-NSR,denies any pain nor SOB, margaret reg diet, up ind, amb to bathroom and voiding o.k, IVF L/R cont@100,seen by cardio, plan is exercise stress echo and possible discharge with event monitor.

## 2021-09-17 NOTE — PROGRESS NOTES
[facility]    Medicine Progress Note - Hospitalist Service       Date of Admission:  9/15/2021    Assessment & Plan              Arlet Palacios is a 45 year old female admitted on 9/15/2021. She presents with syncope.       Syncope    Type 2 Demand Myocardial ischemia    Assessment: Presents with an episode of syncope following a 4 mile race.  She denies any chest pain or shortness of breath prior to her syncope.  She does endorse feeling lightheaded over the past several days.  She is chest pain-free, and EKG on admission showed no dynamic ST changes to suggest ACS.  Troponin is mildly elevated 0.165, likely etiology.  Possibly an NSTEMI/type II demand ischemia.     Plan:   -Appreciate Cardiology review.  stress test reviewed and negative for ischemia. Plan for 7 day ZIO patch monitor for additional monitoring to exclude underlying arrhythmia.  Follow up with cardiology VIRAJ to be arranged.    Acute kidney injury  Assessment/Plan: Creatinine elevated on admission at 1.33, suspect secondary to prerenal etiology given her poor p.o. intake over the past day.  Ct to hydrate and recheck BMP   Creatinine   Date Value Ref Range Status   09/17/2021 0.99 0.52 - 1.04 mg/dL Final     Significant transaminitis: Likely shock liver  -We will try to add on hepatic panel from now from yesterday and trend.  Will need ultrasound  -GI consult      Psychosis, unspecified psychosis type (H)    Agitation    Assessment/Plan: Unclear etiology as patient is now back to her baseline mental status.  She denies any history of hallucinations/suicidal ideations.      Lactic acidosis    Metabolic acidosis    Assessment/Plan: Lactic acid of 2.6 on admission, suspect is secondary to hypovolemia.  Otherwise there is no objective evidence of infection driving her acidosis.      Hypothyroidism (Known to have Hashimotos)     Assessment/Plan: TSH on admission of 20.28,  Ct PTA Synthroid 75 mcg . Follow-up as an  "outpatient     Diet: Regular Diet Adult    DVT Prophylaxis: Ambulate every shift  Cleaning Catheter: Not present  Central Lines: None  Code Status: Full Code      Disposition Plan   Expected discharge:  tomorrow recommended to prior living arrangement once adequate pain management/ tolerating PO medications and cardiac stress test.  Need liver function to stabilize/improve before discharge.    The patient's care was discussed with the Bedside Nurse, Patient, Patient's Family and Cardiology Consultant.    Ziyad Rasmussen MD, MD  Hospitalist Service  [facility]  Securely message with the Vocera Web Console (learn more here)  Text page via [employer] Paging/Directory      Clinically Significant Risk Factors Present on Admission              # Thrombocytopenia: Plts = 129 10e3/uL (Ref range: 150 - 450 10e3/uL) on admission, will monitor for bleeding      ______________________________________________________________________    Interval History   Last 24-hour events noted.  Doing okay except inadequate appetite  And stress test this morning  Currently Denies Chest pain/ SOB/ cough, Denies any N&V/ bowel problems  Denies urinary problems , Denies fever/chills/rigors   4 point ROS done and neg unless mentioned    Data reviewed today: I reviewed all medications, new labs and imaging results over the last 24 hours. I personally reviewed the Stress test image(s) showing as below.    Physical Exam   /85 (BP Location: Right arm)   Pulse 67   Temp 98.4  F (36.9  C) (Oral)   Resp 16   Ht 1.651 m (5' 5\")   Wt 61.7 kg (136 lb)   SpO2 100%   BMI 22.63 kg/m    Gen- pleasant lying in bed  HEENT- NAD, DENISE  Neck- supple, no JVD elevation, no thyromegaly  CVS- I+II+ no m/r/g  RS- CTAB  Abdo- soft, no tenderness . No g/r/r  Ext- no edema   CNS- no focal signs     Data    BMP  Recent Labs   Lab 09/17/21  1008 09/16/21  0728 09/15/21  2041    141 144   POTASSIUM 3.8 3.4 3.5   CHLORIDE 111* 111* 108 "   CHINO 8.3* 7.6* 9.5   CO2 25 21 14*   BUN 16 26 27   CR 0.99 1.25* 1.33*   GLC 88 80 164*     CBC  Recent Labs   Lab 21  1008 21  0728 21  0728 09/15/21  2041 09/15/21  2041   WBC 7.7  --  11.6*  --  8.2   RBC 3.93  --  3.91  --  4.38   HGB 12.9   < > 12.9   < > 14.4   HCT 38.5  --  37.8  --  44.9   MCV 98  --  97  --  103*   MCH 32.8  --  33.0  --  32.9   MCHC 33.5  --  34.1  --  32.1   RDW 13.6  --  13.9  --  13.6   *  --  151  --  234    < > = values in this interval not displayed.     INRNo lab results found in last 7 days.  LFTs  Recent Labs   Lab 21  1008 09/15/21  2041   ALKPHOS 79  76 113   AST 4,195*  3,928* 142*   ALT 2,546*  2,470* 79*   BILITOTAL 1.3  1.7* 0.6   PROTTOTAL 5.7*  5.7* 7.9   ALBUMIN 3.0*  3.0* 4.1      PANCNo lab results found in last 7 days.    Recent Results (from the past 24 hour(s))   Echo Stress Echocardiogram    Narrative    [id number]  [id number]  [id number]  [id number]^CHER^JAQUELINE^HAILEY     [facility]  Echocardiography Laboratory  [address] [place], [place] [address]     Name: TRUONG BARR  MRN: [id number]  : 1975  Study Date: 2021 07:59 AM  Age: 45 yrs  Gender: Female  Patient Location: [facility]  Reason For Study: Dizziness  Ordering Physician: JAQUELINE KWON  Referring Physician: [facility] RERE  Performed By: Geneva Luna, ERMA     BSA: 1.7 m2  Height: 65 in  Weight: 136 lb  HR: 65  BP: 108/76 mmHg  ______________________________________________________________________________  Procedure  Stress Echo Complete.  ______________________________________________________________________________  Interpretation Summary  The patient exhibited no chest pain during exercise.  This was a normal stress echocardiogram with no evidence of stress-induced  ischemia.  The Duke treadmill score was low risk ( >5 Duke  score).  ______________________________________________________________________________  Stress  The patient exercised 14:30 min.  Exercise was stopped due to fatigue.  There was a normal BP response to exercise.  The patient exhibited no chest pain during exercise.  A treadmill exercise test according to the Jaya protocol was performed.  Target Heart Rate was achieved.  There was no chest pain or significant ST changes with exercise.  The Duke treadmill score was low risk ( >5 Duke score).  This was a normal stress EKG with no evidence of stress-induced ischemia.  This was a normal stress echocardiogram with no evidence of stress-induced  ischemia.     Baseline  The patient is in normal sinus rhythm.  Normal left ventricular function and wall motion at rest and post-stress.     Stress Results         Protocol:  Jaya Protocol        Maximum Predicted HR:   174 bpm         Target HR: 148 bpm               % Maximum Predicted HR: 94 %              Duration  Heart    Stage   (mm:ss)   Rate     BP                    Comment                     (bpm)   Stage 1   3:00      93    126/70   Stage 2   3:00     117    144/76   Stage 3   3:00     136    160/92   Stage 4   3:00     148    158/84   Stage 5   2:30     164    160/82RPP: 26,240; FAC: Above Average; Chua Score:                                   14  RECOVERYR  6:00      80    128/70             Stress Duration:   14:30 mm:ss        Recovery Time: 6:00 mm:ss          Maximum Stress HR: 164 bpm            METS:          17     ______________________________________________________________________________  Report approved by: Elvin Warren 09/17/2021 10:30 AM     ______________________________________________________________________________

## 2021-09-18 ENCOUNTER — APPOINTMENT (OUTPATIENT)
Dept: ULTRASOUND IMAGING | Facility: CLINIC | Age: 46
End: 2021-09-18
Attending: INTERNAL MEDICINE
Payer: COMMERCIAL

## 2021-09-18 LAB
ALBUMIN SERPL-MCNC: 2.9 G/DL (ref 3.4–5)
ALP SERPL-CCNC: 75 U/L (ref 40–150)
ALT SERPL W P-5'-P-CCNC: 2960 U/L (ref 0–50)
ANION GAP SERPL CALCULATED.3IONS-SCNC: 3 MMOL/L (ref 3–14)
AST SERPL W P-5'-P-CCNC: 4079 U/L (ref 0–45)
BASOPHILS # BLD AUTO: 0.1 10E3/UL (ref 0–0.2)
BASOPHILS NFR BLD AUTO: 1 %
BILIRUB SERPL-MCNC: 1.5 MG/DL (ref 0.2–1.3)
BUN SERPL-MCNC: 12 MG/DL (ref 7–30)
CALCIUM SERPL-MCNC: 8.1 MG/DL (ref 8.5–10.1)
CHLORIDE BLD-SCNC: 109 MMOL/L (ref 94–109)
CK SERPL-CCNC: 764 U/L (ref 30–225)
CO2 SERPL-SCNC: 27 MMOL/L (ref 20–32)
CREAT SERPL-MCNC: 0.93 MG/DL (ref 0.52–1.04)
EOSINOPHIL # BLD AUTO: 0.1 10E3/UL (ref 0–0.7)
EOSINOPHIL NFR BLD AUTO: 2 %
ERYTHROCYTE [DISTWIDTH] IN BLOOD BY AUTOMATED COUNT: 13.2 % (ref 10–15)
ERYTHROCYTE [DISTWIDTH] IN BLOOD BY AUTOMATED COUNT: 13.3 % (ref 10–15)
FERRITIN SERPL-MCNC: 4283 NG/ML (ref 8–252)
GFR SERPL CREATININE-BSD FRML MDRD: 74 ML/MIN/1.73M2
GLUCOSE BLD-MCNC: 74 MG/DL (ref 70–99)
HCT VFR BLD AUTO: 38.5 % (ref 35–47)
HCT VFR BLD AUTO: 39.5 % (ref 35–47)
HGB BLD-MCNC: 13 G/DL (ref 11.7–15.7)
HGB BLD-MCNC: 13.6 G/DL (ref 11.7–15.7)
IMM GRANULOCYTES # BLD: 0 10E3/UL
IMM GRANULOCYTES NFR BLD: 0 %
INR PPP: 1.41 (ref 0.85–1.15)
LYMPHOCYTES # BLD AUTO: 1.9 10E3/UL (ref 0.8–5.3)
LYMPHOCYTES NFR BLD AUTO: 31 %
MCH RBC QN AUTO: 32.8 PG (ref 26.5–33)
MCH RBC QN AUTO: 33.3 PG (ref 26.5–33)
MCHC RBC AUTO-ENTMCNC: 33.8 G/DL (ref 31.5–36.5)
MCHC RBC AUTO-ENTMCNC: 34.4 G/DL (ref 31.5–36.5)
MCV RBC AUTO: 97 FL (ref 78–100)
MCV RBC AUTO: 97 FL (ref 78–100)
MONOCYTES # BLD AUTO: 0.5 10E3/UL (ref 0–1.3)
MONOCYTES NFR BLD AUTO: 8 %
NEUTROPHILS # BLD AUTO: 3.6 10E3/UL (ref 1.6–8.3)
NEUTROPHILS NFR BLD AUTO: 58 %
NRBC # BLD AUTO: 0 10E3/UL
NRBC BLD AUTO-RTO: 0 /100
PLATELET # BLD AUTO: 120 10E3/UL (ref 150–450)
PLATELET # BLD AUTO: 145 10E3/UL (ref 150–450)
POTASSIUM BLD-SCNC: 4.1 MMOL/L (ref 3.4–5.3)
PROT SERPL-MCNC: 5.6 G/DL (ref 6.8–8.8)
RBC # BLD AUTO: 3.96 10E6/UL (ref 3.8–5.2)
RBC # BLD AUTO: 4.09 10E6/UL (ref 3.8–5.2)
RETICS # AUTO: 0.05 10E6/UL (ref 0.03–0.1)
RETICS/RBC NFR AUTO: 1.2 % (ref 0.5–2)
SODIUM SERPL-SCNC: 139 MMOL/L (ref 133–144)
WBC # BLD AUTO: 5.1 10E3/UL (ref 4–11)
WBC # BLD AUTO: 6.2 10E3/UL (ref 4–11)

## 2021-09-18 PROCEDURE — 96361 HYDRATE IV INFUSION ADD-ON: CPT

## 2021-09-18 PROCEDURE — 86704 HEP B CORE ANTIBODY TOTAL: CPT | Performed by: INTERNAL MEDICINE

## 2021-09-18 PROCEDURE — 86803 HEPATITIS C AB TEST: CPT | Performed by: INTERNAL MEDICINE

## 2021-09-18 PROCEDURE — 76705 ECHO EXAM OF ABDOMEN: CPT

## 2021-09-18 PROCEDURE — 86431 RHEUMATOID FACTOR QUANT: CPT | Performed by: INTERNAL MEDICINE

## 2021-09-18 PROCEDURE — 85045 AUTOMATED RETICULOCYTE COUNT: CPT | Performed by: INTERNAL MEDICINE

## 2021-09-18 PROCEDURE — 86709 HEPATITIS A IGM ANTIBODY: CPT | Performed by: INTERNAL MEDICINE

## 2021-09-18 PROCEDURE — 85027 COMPLETE CBC AUTOMATED: CPT | Performed by: INTERNAL MEDICINE

## 2021-09-18 PROCEDURE — 36415 COLL VENOUS BLD VENIPUNCTURE: CPT | Performed by: INTERNAL MEDICINE

## 2021-09-18 PROCEDURE — 82728 ASSAY OF FERRITIN: CPT | Performed by: INTERNAL MEDICINE

## 2021-09-18 PROCEDURE — 82550 ASSAY OF CK (CPK): CPT | Performed by: INTERNAL MEDICINE

## 2021-09-18 PROCEDURE — 99214 OFFICE O/P EST MOD 30 MIN: CPT | Performed by: INTERNAL MEDICINE

## 2021-09-18 PROCEDURE — 85025 COMPLETE CBC W/AUTO DIFF WBC: CPT | Performed by: INTERNAL MEDICINE

## 2021-09-18 PROCEDURE — G0378 HOSPITAL OBSERVATION PER HR: HCPCS

## 2021-09-18 PROCEDURE — 258N000003 HC RX IP 258 OP 636: Performed by: INTERNAL MEDICINE

## 2021-09-18 PROCEDURE — 99233 SBSQ HOSP IP/OBS HIGH 50: CPT | Performed by: INTERNAL MEDICINE

## 2021-09-18 PROCEDURE — 87340 HEPATITIS B SURFACE AG IA: CPT | Performed by: INTERNAL MEDICINE

## 2021-09-18 PROCEDURE — 86706 HEP B SURFACE ANTIBODY: CPT | Performed by: INTERNAL MEDICINE

## 2021-09-18 PROCEDURE — 80053 COMPREHEN METABOLIC PANEL: CPT | Performed by: INTERNAL MEDICINE

## 2021-09-18 PROCEDURE — 86618 LYME DISEASE ANTIBODY: CPT | Performed by: INTERNAL MEDICINE

## 2021-09-18 PROCEDURE — 85610 PROTHROMBIN TIME: CPT | Performed by: INTERNAL MEDICINE

## 2021-09-18 PROCEDURE — 250N000013 HC RX MED GY IP 250 OP 250 PS 637: Performed by: STUDENT IN AN ORGANIZED HEALTH CARE EDUCATION/TRAINING PROGRAM

## 2021-09-18 PROCEDURE — 86038 ANTINUCLEAR ANTIBODIES: CPT | Performed by: INTERNAL MEDICINE

## 2021-09-18 RX ADMIN — SODIUM CHLORIDE, POTASSIUM CHLORIDE, SODIUM LACTATE AND CALCIUM CHLORIDE: 600; 310; 30; 20 INJECTION, SOLUTION INTRAVENOUS at 09:43

## 2021-09-18 RX ADMIN — LEVOTHYROXINE SODIUM 75 MCG: 75 TABLET ORAL at 08:34

## 2021-09-18 RX ADMIN — SODIUM CHLORIDE, POTASSIUM CHLORIDE, SODIUM LACTATE AND CALCIUM CHLORIDE: 600; 310; 30; 20 INJECTION, SOLUTION INTRAVENOUS at 17:24

## 2021-09-18 NOTE — PROGRESS NOTES
Observation goals PRIOR TO DISCHARGE    Comments:   -diagnostic tests and consults completed and resulted: Not met     -vital signs normal or at patient baseline: Partially met, slightly elevated BP     -tolerating oral intake to maintain hydration: Not met. NPO at the moment.      -adequate pain control on oral analgesics : Met     -returns to baseline functional status: Met     -safe disposition plan has been identified : Met     Nurse to notify provider when observation goals have been met and patient is ready for discharge.

## 2021-09-18 NOTE — PLAN OF CARE
Pt A&O X4. VSS on RA except bradycardia. Tele SB. Elevated  AST/ALT. GI consulted. NPO. IVF LR @125 ml/hr. Up independent. Denies pain. US abdomen done @0647. Continue to monitor.     Observation goals PRIOR TO DISCHARGE    Comments: -diagnostic tests and consults completed and resulted -Not met  -vital signs normal or at patient baseline -Partially met  -tolerating oral intake to maintain hydration -Met  -adequate pain control on oral analgesics -Met  -returns to baseline functional status -Met  -safe disposition plan has been identified - Not met  Nurse to notify provider when observation goals have been met and patient is ready for discharge.

## 2021-09-18 NOTE — CONSULTS
"[facility]    Gastroenterology Consultation    Date of Admission:  9/15/2021    History of Present Illness   Arlet Palacios is a 45 year old female who presents with syncope and GI consulted for elevated liver enzyme. Patient was in her usual health, and was running a 4 mile race but just before finishing, she had a syncopal episode and collapsed and afterwards appeared to be confused, speaking \"nonsensical gibberish\".  Given her syncope and altered mental status, EMS was activated for further evaluation and patient was transported to the emergency department.  Patient states she is a very healthy person, regular exercise without symptoms.  She denies any history of syncope, no history of coronary disease.  She even ran a marathon without any problem in the past and work as a .    During the hospitalization she was found to have only slight elevation in AST ALT but acutely elevated to AST 4079 and ALT of 2960, T bili 1.5 with normal alkaline phosphatase.  INR slightly elevated at 1.4.  She denies any liver disease in the past, denies any high risks feature for viral hepatitis such as recent blood transfusion or unprotected sex.  Otherwise she has no symptoms and feeling well.  Arava current sono showing thickening of gallbladder neck but otherwise no stones sludge and CBD within normal limit.  Her CK still elevated in 764 which also coming up together with a liver test.  She denies use of any medication besides some supplements including fish oil vitamin C vitamin D but she does use an agent call rhodiola but she has been on the supplement for 3 years.    Assessment & Plan   Arlet aPlacios is a 45 year old female who was admitted on 9/15/2021. I was asked to see the patient for elevated liver enzyme.  -Based on history of syncope, with elevated CK patient most likely has ischemic hepatitis/rhabdomyolysis  -Currently still picking given rhabdomyolysis still ongoing " reflected by elevated CK  -We will recommend large volume resuscitation and patient can have regular food given no intervention needed at this time  -The gallbladder neck thickening is likely due to inflammation from the liver while gallbladder neck is probably adjacent to the liver can repeat ultrasound in 3 to 6 months to follow-up given patient has no pain at this time  -Other possibility for this degree of elevation of liver test is viral hepatitis, can check hepatitis A IgM, hepatitis B total IgG and IgM, surface antigen and antibody, hepatitis C antibody  -Other differential also include drug-induced liver injury but patient has been Rhodiola for 3 years with a good history of syncope, it is very unlikely  -Discharge depends on liver tests, if we see downtrend, may consider discharge with outpatient follow-up    [address] level 2 consult    Principal Problem:    Psychosis, unspecified psychosis type (H)  Active Problems:    Lactic acidosis    Agitation    Myocardial ischemia    Metabolic acidosis    Hypothyroidism      Guadalupe Del Valle MD  (Brant)  [facility]  Office: [phone]  Cell: [phone], please feel free to call the cell    Code Status    Full Code      Primary Care Physician   [facility]      Guadalupe Del Valle MD  (Brant)  [facility]  Office: [phone]  Cell: [phone], please feel free to call the cell      Past Medical History   I have reviewed this patient's medical history and updated it with pertinent information if needed.   Past Medical History:   Diagnosis Date     Hypothyroidism        Past Surgical History   I have reviewed this patient's surgical history and updated it with pertinent information if needed.  History reviewed. No pertinent surgical history.    Prior to Admission Medications   Prior to Admission Medications   Prescriptions Last Dose Informant Patient Reported? Taking?   Salicylic Acid (COMPOUND  W EX) 9/15/2021 at am Self Yes Yes   Sig: Compounded testosterone 4g behind the knees every morning.   calcium-magnesium (CALMAG) 500-250 MG TABS per tablet 9/14/2021 at pm Self Yes Yes   Sig: Take 1 tablet by mouth every evening   fish oil-omega-3 fatty acids 1000 MG capsule 9/15/2021 at am Self Yes Yes   Sig: Take 2 g by mouth daily   levothyroxine (SYNTHROID/LEVOTHROID) 75 MCG tablet 9/15/2021 at Unknown time Pharmacy Yes Yes   Sig: Take 75 mcg by mouth daily   losartan (COZAAR) 25 MG tablet has not started yet Pharmacy Yes No   Sig: Take 25 mg by mouth daily   progesterone (PROMETRIUM) 100 MG capsule 9/14/2021 at pm Pharmacy Yes Yes   Sig: Take 100 mg by mouth every evening   vitamin B complex with vitamin C (VITAMIN  B COMPLEX) tablet 9/15/2021 at am Self Yes Yes   Sig: Take 1 tablet by mouth daily      Facility-Administered Medications: None     Allergies   Allergies   Allergen Reactions     Flu Virus Vaccine Anaphylaxis     Contrast Dye      PN: LW CM1: CONTRAST- nka Reaction :     Other Environmental Allergy      PN: LW Other1: -nka       Social History   I have reviewed this patient's social history and updated it with pertinent information if needed. Arelt Palacios      Family History   I have reviewed this patient's family history and updated it with pertinent information if needed.   Family History   Problem Relation Age of Onset     No Known Problems Mother      No Known Problems Father        Review of Systems   The 10 point Review of Systems is negative other than noted in the HPI or here.     Physical Exam   Temp: 98.5  F (36.9  C) Temp src: Oral BP: (!) 153/88 Pulse: (!) 48   Resp: 18 SpO2: 100 % O2 Device: None (Room air)    Vital Signs with Ranges  Temp:  [97.5  F (36.4  C)-98.5  F (36.9  C)] 98.5  F (36.9  C)  Pulse:  [44-57] 48  Resp:  [16-18] 18  BP: (140-166)/(82-89) 153/88  SpO2:  [95 %-100 %] 100 %  136 lbs 0 oz    Exam:  Constitutional: healthy, alert and no distress  Head:  Normocephalic. No masses, lesions, tenderness or abnormalities  Neck: Neck supple. No adenopathy. Thyroid symmetric, normal size,, Carotids without bruits.  ENT: ENT exam normal, no neck nodes or sinus tenderness  Cardiovascular: negative, PMI normal. No lifts, heaves, or thrills. RRR. No murmurs, clicks gallops or rub  Respiratory: negative, Percussion normal. Good diaphragmatic excursion. Lungs clear  Gastrointestinal: Abdomen soft, non-tender. BS normal. No masses, organomegaly  : Deferred  Musculoskeletal: extremities normal- no gross deformities noted, gait normal and normal muscle tone  Skin: no suspicious lesions or rashes  Neurologic: Gait normal. Reflexes normal and symmetric. Sensation grossly WNL.  Psychiatric: mentation appears normal and affect normal/bright  Hematologic/Lymphatic/Immunologic: Normal cervical lymph nodes     Data   Results for orders placed or performed during the hospital encounter of 09/15/21 (from the past 24 hour(s))   Gastroenterology IP Consult: Transminitis. Likely shock liver; Consultant may enter orders: Yes; Patient to be seen: Routine - within 24 hours; Requested Clinic/Group: Dr. Goldberg; Requesting provider? Hospitalist (if different from attending physi...    Narrative    [facility], Gilberto Adhikari MD     9/17/2021  3:25 PM  Acute liver injury.Still up trending LFTS likely ischemic liver   injury. Other D/D acute hepatitis  Repeat labs in AM check CK, NH4 and INR  I/V fluids  Check acute hepatitis panel    Gilberto Goldberg MD     CBC with platelets   Result Value Ref Range    WBC Count 5.1 4.0 - 11.0 10e3/uL    RBC Count 3.96 3.80 - 5.20 10e6/uL    Hemoglobin 13.0 11.7 - 15.7 g/dL    Hematocrit 38.5 35.0 - 47.0 %    MCV 97 78 - 100 fL    MCH 32.8 26.5 - 33.0 pg    MCHC 33.8 31.5 - 36.5 g/dL    RDW 13.3 10.0 - 15.0 %    Platelet Count 120 (L) 150 - 450 10e3/uL   Comprehensive metabolic panel   Result Value Ref Range    Sodium 139 133 - 144 mmol/L    Potassium 4.1 3.4 - 5.3  mmol/L    Chloride 109 94 - 109 mmol/L    Carbon Dioxide (CO2) 27 20 - 32 mmol/L    Anion Gap 3 3 - 14 mmol/L    Urea Nitrogen 12 7 - 30 mg/dL    Creatinine 0.93 0.52 - 1.04 mg/dL    Calcium 8.1 (L) 8.5 - 10.1 mg/dL    Glucose 74 70 - 99 mg/dL    Alkaline Phosphatase 75 40 - 150 U/L    AST 4,079 (HH) 0 - 45 U/L    ALT 2,960 (HH) 0 - 50 U/L    Protein Total 5.6 (L) 6.8 - 8.8 g/dL    Albumin 2.9 (L) 3.4 - 5.0 g/dL    Bilirubin Total 1.5 (H) 0.2 - 1.3 mg/dL    GFR Estimate 74 >60 mL/min/1.73m2   INR   Result Value Ref Range    INR 1.41 (H) 0.85 - 1.15   CK total   Result Value Ref Range     (H) 30 - 225 U/L   Ferritin   Result Value Ref Range    Ferritin 4,283 (H) 8 - 252 ng/mL   US Abdomen Limited    Narrative    ULTRASOUND ABDOMEN LIMITED September 18, 2021 7:45 AM    CLINICAL HISTORY: Transaminitis.    TECHNIQUE: Limited abdominal ultrasound.    COMPARISON: None.    FINDINGS:  GALLBLADDER: No shadowing gallstones are identified. There is a focal  area of gallbladder wall thickening near the gallbladder neck,  measuring approximately 0.8 cm, however the gallbladder is otherwise  unremarkable. No pericholecystic fluid is identified.    BILE DUCTS: There is no biliary dilatation. The common duct measures 2  mm. Portions of the common duct could not be visualized due to  overlying bowel gas.    LIVER: Unremarkable. No evidence for fatty infiltration of the liver.  No focal hepatic masses.    RIGHT KIDNEY: Unremarkable. No hydronephrosis.    PANCREAS: The visualized portions of the pancreas are normal.    Trace amount of ascites in the right upper quadrant.      Impression    IMPRESSION:  1.  Focal area of gallbladder wall thickening is noted near the  gallbladder neck, measuring approximately 0.8 cm. This finding is of  uncertain clinical significance. Gallbladder neoplasm is considered  unlikely from this appearance, however a follow-up ultrasound in three  to six months is recommended to ensure resolution.  2.   No other gallbladder abnormalities are identified. No biliary  dilatation.  3.  Trace amount of ascites in the right upper quadrant.

## 2021-09-18 NOTE — PROGRESS NOTES
Observation goals PRIOR TO DISCHARGE    Comments:   -diagnostic tests and consults completed and resulted: Partially met.     -vital signs normal or at patient baseline: Partially met, slightly elevated BP     -tolerating oral intake to maintain hydration: Met     -adequate pain control on oral analgesics : Met     -returns to baseline functional status: Met     -safe disposition plan has been identified : Met     Nurse to notify provider when observation goals have been met and patient is ready for discharge.

## 2021-09-18 NOTE — UTILIZATION REVIEW
"Admission Status; Secondary Review Determination     Admission Date: 9/15/2021  8:09 PM       Under the authority of the Utilization Management Committee, the utilization review process indicated a secondary review on the above patient.  The review outcome is based on review of the medical records, discussions with staff, and applying clinical experience noted on the date of the review.          (x) Observation Status Appropriate - This patient does not meet hospital inpatient criteria and is placed in observation status. If this patient's primary payer is Medicare and was admitted as an inpatient, Condition Code 44 should be used and patient status changed to \"observation\".       RATIONALE FOR DETERMINATION      Brief clinical presentation, information copied from the chart, abbreviated and edited for relevant content:     Consider review tomorrow if unable to discharge tomorrow. Consider IP admission if not improved.     Arlet Palacios is a 45 year old female who was admitted on 9/15/2021 with syncope, with elevated CK and LFTs (AST/ALT 4079/2960) -  patient most likely has ischemic hepatitis/rhabdomyolysis per GI. Plan today for large volume resuscitation and patient can have regular food given no intervention needed at this time. Other possibility for this degree of elevation of liver test is viral hepatitis, plan to check hepatitis A IgM, hepatitis B total IgG and IgM, surface antigen and antibody, hepatitis C antibody. Discharge depends on liver tests, if a downtrend, may consider discharge with outpatient follow-up. CK increased from 540 to 764.  Ferritin 4283. Bilirubin 0.8.      The severity of illness, intensity of cares provided, risk for adverse outcome, and expected LOS make the care appropriate for observation.       The information on this document is developed by the utilization review team in order for the business office to ensure compliance.  This only denotes the appropriateness of proper " admission status and does not reflect the quality of care rendered.         The definitions of Inpatient Status and Observation Status used in making the determination above are those provided in the CMS Coverage Manual, Chapter 1 and Chapter 6, section 70.4.      Sincerely,     Vaishnavi Lombardo MD   Utilization Review/ Case Management  [facility].

## 2021-09-18 NOTE — PROGRESS NOTES
Observation goals PRIOR TO DISCHARGE    Comments: -diagnostic tests and consults completed and resulted -Not met  -vital signs normal or at patient baseline -Partially met  -tolerating oral intake to maintain hydration -Met  -adequate pain control on oral analgesics -Met  -returns to baseline functional status -Met  -safe disposition plan has been identified - Not met  Nurse to notify provider when observation goals have been met and patient is ready for discharge.

## 2021-09-18 NOTE — PROGRESS NOTES
"Cardiology Progress Note          Assessment and Plan:         46 YO F with Hashimoto's thyroiditis/asthma admitted 09/15/2021 after experiencing an episode of altered MS/collapse after a trail run the day of admission. She was found to have mildly elevated troponins/mild KYLAH and evidence of ischemic hepatitis. A subsequent stress echocardiogram yesterday was within normal limits with the patient exercising for 14 minutes according to the Jaya protocol with a normal chronotropic response to exercise.    IMPRESSION    1) Presyncope/syncope: Normal stress echo as described above. Sinus bradycardia noted on overnight telemetry.  2) Mildly elevated troponins that probably represent type 2 myocardial injury in the setting of hemodynamic stress    PLAN  -I reviewed her overnight telemetry.  Sinus bradycardia is probably a consequence of heightened vagal tone during the nighttime hours.  She has had no further recurrence of the symptoms that prompted her admission, and her stress echocardiogram demonstrated a normal chronotropic response to exercise.  -I will order a Lyme disease panel but would otherwise recommend follow-up after her Zio patch monitor is completed.  There certainly could be a familial component to her presyncope/sinus bradycardia given that her sister has a history of recurrent syncope and is status post pacemaker implantation.  -An outpatient evaluation for sleep apnea may also be reasonable.        Interval History:     Feels well today.  She has not had any further syncopal episodes.  She was noted to have sinus bradycardia overnight with a heart rate in the high 30s at approximately 3 AM.             Review of Systems:   As per subjective, otherwise 5 systems reviewed and negative.           Physical Exam:   Blood pressure (!) 153/88, pulse (!) 48, temperature 98.5  F (36.9  C), temperature source Oral, resp. rate 18, height 1.651 m (5' 5\"), weight 61.7 kg (136 lb), SpO2 100 %.      Vital Sign " Ranges  Temperature Temp  Av.9  F (36.6  C)  Min: 97.5  F (36.4  C)  Max: 98.5  F (36.9  C)   Blood pressure Systolic (24hrs), Av , Min:140 , Max:166        Diastolic (24hrs), Av, Min:82, Max:89      Pulse Pulse  Av  Min: 44  Max: 57   Respirations Resp  Av.8  Min: 16  Max: 18   Pulse oximetry SpO2  Av.4 %  Min: 95 %  Max: 100 %       No intake or output data in the 24 hours ending 21 1313    Constitutional: NAD  Skin: Warm and dry  Neck: No JVD  Lungs: CTA  Cardiovascular: RRR, no m/r/g  Abdomen: Soft, non tender.  Extremities and Back: No LE edema  Neurological: Nonfocal           Medications:     I have reviewed this patient's current medications.              Data:     Labs reviewed.     Tele: NSR with sinus bradycardia - rates of approximately 38-40 at 3 AM.         Matthew Burt MD, M.D.

## 2021-09-18 NOTE — PROGRESS NOTES
[facility]    Medicine Progress Note - Hospitalist Service       Date of Admission:  9/15/2021    Assessment & Plan            Arlet Palacios is a 45 year old female admitted on 9/15/2021. She presents with syncope.       Syncope    Type 2 Demand Myocardial ischemia  Sinus bradycardia at night    Assessment: Presents with an episode of syncope following a 4 mile race.  She denies any chest pain or shortness of breath prior to her syncope.  She does endorse feeling lightheaded over the past several days.  She is chest pain-free, and EKG on admission showed no dynamic ST changes to suggest ACS.  Troponin is mildly elevated 0.165, likely etiology.  Possibly an NSTEMI/type II demand ischemia.     Plan:   -Appreciate Cardiology review.  stress test reviewed and negative for ischemia. Plan for 7 day ZIO patch monitor for additional monitoring to exclude underlying arrhythmia.  Follow up with cardiology VIRAJ to be arranged.  Follow-up Lyme panel    Acute kidney injury-resolved  Assessment/Plan: Creatinine elevated on admission at 1.33, suspect secondary to prerenal etiology given her poor p.o. intake over the past day.  Ct to hydrate and recheck BMP   Creatinine   Date Value Ref Range Status   09/18/2021 0.93 0.52 - 1.04 mg/dL Final     Significant transaminitis: Likely shock liver  -ultrasound reviewed  -GI consult appreciated  -Follow-up hepatitis serology.  Continue IV fluids  -Need downtrend of AST/ALT before discharge.  Will recheck tomorrow morning    Thrombocytopenia: Likely related to acute renal injury  Trend for now.  We will also order peripheral smear      Psychosis, unspecified psychosis type (H)    Agitation    Assessment/Plan: Unclear etiology as patient is now back to her baseline mental status.  She denies any history of hallucinations/suicidal ideations.      Lactic acidosis    Metabolic acidosis    Assessment/Plan: Lactic acid of 2.6 on admission, suspect is secondary to  "hypovolemia.  Otherwise there is no objective evidence of infection driving her acidosis.      Hypothyroidism (Known to have Hashimotos)     Assessment/Plan: TSH on admission of 20.28,  Ct PTA Synthroid 75 mcg . Follow-up as an outpatient     Diet: Regular Diet Adult    DVT Prophylaxis: Ambulate every shift  Cleaning Catheter: Not present  Central Lines: None  Code Status: Full Code      Disposition Plan   Expected discharge: tomorrow recommended to prior living arrangement once LFTs improved.  Need liver function to stabilize/improve before discharge.    The patient's care was discussed with the Bedside Nurse, Patient, Patient's Family and Cardiology Consultant.    Ziyad Rasmussen MD, MD  Hospitalist Service  [facility]  Securely message with the Vocera Web Console (learn more here)  Text page via [employer] Paging/Directory      Clinically Significant Risk Factors Present on Admission             # Coagulation Defect: INR = 1.41 (Ref range: 0.85 - 1.15) and/or PTT = N/A on admission, will monitor for bleeding  # Thrombocytopenia: Plts = 120 10e3/uL (Ref range: 150 - 450 10e3/uL) on admission, will monitor for bleeding      ______________________________________________________________________    Interval History   Last 24-hour events noted.  Denies any new complaints  Currently Denies Chest pain/ SOB/ cough, Denies any N&V/ bowel problems  Denies urinary problems , Denies fever/chills/rigors   4 point ROS done and neg unless mentioned    Data reviewed today: I reviewed all medications, new labs and imaging results over the last 24 hours. I personally reviewed the Stress test image(s) showing as below.    Physical Exam   BP (!) 153/88 (BP Location: Right arm)   Pulse (!) 48   Temp 98.5  F (36.9  C) (Oral)   Resp 18   Ht 1.651 m (5' 5\")   Wt 61.7 kg (136 lb)   SpO2 100%   BMI 22.63 kg/m    Gen- pleasant lying in bed  HEENT- NAD, DENISE  Neck- supple, no JVD elevation, no thyromegaly  CVS- " I+II+ no m/r/g  RS- CTAB  Abdo- soft, no tenderness . No g/r/r  Ext- no edema   CNS- no focal signs     Data    BMP  Recent Labs   Lab 09/18/21  0622 09/17/21  1008 09/16/21  0728 09/15/21  2041    140 141 144   POTASSIUM 4.1 3.8 3.4 3.5   CHLORIDE 109 111* 111* 108   CHINO 8.1* 8.3* 7.6* 9.5   CO2 27 25 21 14*   BUN 12 16 26 27   CR 0.93 0.99 1.25* 1.33*   GLC 74 88 80 164*     CBC  Recent Labs   Lab 09/18/21  0622 09/17/21  1008 09/17/21  1008 09/16/21  0728 09/16/21  0728 09/15/21  2041 09/15/21  2041   WBC 5.1  --  7.7  --  11.6*  --  8.2   RBC 3.96  --  3.93  --  3.91  --  4.38   HGB 13.0   < > 12.9   < > 12.9   < > 14.4   HCT 38.5  --  38.5  --  37.8  --  44.9   MCV 97  --  98  --  97  --  103*   MCH 32.8  --  32.8  --  33.0  --  32.9   MCHC 33.8  --  33.5  --  34.1  --  32.1   RDW 13.3  --  13.6  --  13.9  --  13.6   *  --  129*  --  151  --  234    < > = values in this interval not displayed.     INR  Recent Labs   Lab 09/18/21 0622   INR 1.41*     LFTs  Recent Labs   Lab 09/18/21  0622 09/17/21  1008 09/16/21  0728 09/15/21  2041   ALKPHOS 75 79  76 78 113   AST 4,079* 4,195*  3,928* 180* 142*   ALT 2,960* 2,546*  2,470* 106* 79*   BILITOTAL 1.5* 1.3  1.7* 0.8 0.6   PROTTOTAL 5.6* 5.7*  5.7* 6.1* 7.9   ALBUMIN 2.9* 3.0*  3.0* 3.2* 4.1      PANCNo lab results found in last 7 days.    Recent Results (from the past 24 hour(s))   US Abdomen Limited    Narrative    ULTRASOUND ABDOMEN LIMITED September 18, 2021 7:45 AM    CLINICAL HISTORY: Transaminitis.    TECHNIQUE: Limited abdominal ultrasound.    COMPARISON: None.    FINDINGS:  GALLBLADDER: No shadowing gallstones are identified. There is a focal  area of gallbladder wall thickening near the gallbladder neck,  measuring approximately 0.8 cm, however the gallbladder is otherwise  unremarkable. No pericholecystic fluid is identified.    BILE DUCTS: There is no biliary dilatation. The common duct measures 2  mm. Portions of the common duct  could not be visualized due to  overlying bowel gas.    LIVER: Unremarkable. No evidence for fatty infiltration of the liver.  No focal hepatic masses.    RIGHT KIDNEY: Unremarkable. No hydronephrosis.    PANCREAS: The visualized portions of the pancreas are normal.    Trace amount of ascites in the right upper quadrant.      Impression    IMPRESSION:  1.  Focal area of gallbladder wall thickening is noted near the  gallbladder neck, measuring approximately 0.8 cm. This finding is of  uncertain clinical significance. Gallbladder neoplasm is considered  unlikely from this appearance, however a follow-up ultrasound in three  to six months is recommended to ensure resolution.  2.  No other gallbladder abnormalities are identified. No biliary  dilatation.  3.  Trace amount of ascites in the right upper quadrant.

## 2021-09-19 ENCOUNTER — APPOINTMENT (OUTPATIENT)
Dept: CARDIOLOGY | Facility: CLINIC | Age: 46
End: 2021-09-19
Attending: INTERNAL MEDICINE
Payer: COMMERCIAL

## 2021-09-19 VITALS
OXYGEN SATURATION: 99 % | BODY MASS INDEX: 22.49 KG/M2 | DIASTOLIC BLOOD PRESSURE: 94 MMHG | HEIGHT: 65 IN | RESPIRATION RATE: 18 BRPM | HEART RATE: 45 BPM | WEIGHT: 135 LBS | SYSTOLIC BLOOD PRESSURE: 158 MMHG | TEMPERATURE: 98 F

## 2021-09-19 LAB
ALBUMIN SERPL-MCNC: 2.7 G/DL (ref 3.4–5)
ALP SERPL-CCNC: 69 U/L (ref 40–150)
ALT SERPL W P-5'-P-CCNC: 1864 U/L (ref 0–50)
ANION GAP SERPL CALCULATED.3IONS-SCNC: 5 MMOL/L (ref 3–14)
AST SERPL W P-5'-P-CCNC: 1397 U/L (ref 0–45)
BILIRUB SERPL-MCNC: 1.8 MG/DL (ref 0.2–1.3)
BUN SERPL-MCNC: 10 MG/DL (ref 7–30)
CALCIUM SERPL-MCNC: 8.3 MG/DL (ref 8.5–10.1)
CHLORIDE BLD-SCNC: 109 MMOL/L (ref 94–109)
CK SERPL-CCNC: 606 U/L (ref 30–225)
CO2 SERPL-SCNC: 27 MMOL/L (ref 20–32)
CREAT SERPL-MCNC: 0.76 MG/DL (ref 0.52–1.04)
ERYTHROCYTE [DISTWIDTH] IN BLOOD BY AUTOMATED COUNT: 13.1 % (ref 10–15)
GFR SERPL CREATININE-BSD FRML MDRD: >90 ML/MIN/1.73M2
GLUCOSE BLD-MCNC: 90 MG/DL (ref 70–99)
HCT VFR BLD AUTO: 35.1 % (ref 35–47)
HGB BLD-MCNC: 12.3 G/DL (ref 11.7–15.7)
MCH RBC QN AUTO: 33.2 PG (ref 26.5–33)
MCHC RBC AUTO-ENTMCNC: 35 G/DL (ref 31.5–36.5)
MCV RBC AUTO: 95 FL (ref 78–100)
PLATELET # BLD AUTO: 121 10E3/UL (ref 150–450)
POTASSIUM BLD-SCNC: 3.8 MMOL/L (ref 3.4–5.3)
PROT SERPL-MCNC: 5.2 G/DL (ref 6.8–8.8)
RBC # BLD AUTO: 3.7 10E6/UL (ref 3.8–5.2)
SODIUM SERPL-SCNC: 141 MMOL/L (ref 133–144)
WBC # BLD AUTO: 5.5 10E3/UL (ref 4–11)

## 2021-09-19 PROCEDURE — 36415 COLL VENOUS BLD VENIPUNCTURE: CPT | Performed by: INTERNAL MEDICINE

## 2021-09-19 PROCEDURE — 99239 HOSP IP/OBS DSCHRG MGMT >30: CPT | Performed by: INTERNAL MEDICINE

## 2021-09-19 PROCEDURE — 80053 COMPREHEN METABOLIC PANEL: CPT | Performed by: INTERNAL MEDICINE

## 2021-09-19 PROCEDURE — 96361 HYDRATE IV INFUSION ADD-ON: CPT

## 2021-09-19 PROCEDURE — 258N000003 HC RX IP 258 OP 636: Performed by: INTERNAL MEDICINE

## 2021-09-19 PROCEDURE — 85027 COMPLETE CBC AUTOMATED: CPT | Performed by: INTERNAL MEDICINE

## 2021-09-19 PROCEDURE — 93242 EXT ECG>48HR<7D RECORDING: CPT

## 2021-09-19 PROCEDURE — 250N000013 HC RX MED GY IP 250 OP 250 PS 637: Performed by: STUDENT IN AN ORGANIZED HEALTH CARE EDUCATION/TRAINING PROGRAM

## 2021-09-19 PROCEDURE — 82550 ASSAY OF CK (CPK): CPT | Performed by: INTERNAL MEDICINE

## 2021-09-19 PROCEDURE — G0378 HOSPITAL OBSERVATION PER HR: HCPCS

## 2021-09-19 PROCEDURE — 93244 EXT ECG>48HR<7D REV&INTERPJ: CPT | Performed by: INTERNAL MEDICINE

## 2021-09-19 RX ADMIN — SODIUM CHLORIDE, POTASSIUM CHLORIDE, SODIUM LACTATE AND CALCIUM CHLORIDE: 600; 310; 30; 20 INJECTION, SOLUTION INTRAVENOUS at 01:01

## 2021-09-19 RX ADMIN — LEVOTHYROXINE SODIUM 75 MCG: 75 TABLET ORAL at 08:32

## 2021-09-19 ASSESSMENT — MIFFLIN-ST. JEOR: SCORE: 1253.24

## 2021-09-19 NOTE — PLAN OF CARE
Pt is discharging home at this time w/ all pt's belongings. AVS and education given. Questions answered. Pt's Spouse will transport.

## 2021-09-19 NOTE — PLAN OF CARE
Pt A&O X4. VSS on RA except bradycardia. Tele SB. Elevated  AST/ALT. GI consulted. IVF LR @125 ml/hr. Up independent. Denies pain. Cardiology conuslted (see note). Gastro consult. Blood works still in progress. Continue to monitor.

## 2021-09-19 NOTE — PROGRESS NOTES
Observation goals PRIOR TO DISCHARGE    Comments:   -diagnostic tests and consults completed and resulted: Partially met.     -vital signs normal or at patient baseline: Partially met, slightly elevated BP, bradycardia      -tolerating oral intake to maintain hydration: Met     -adequate pain control on oral analgesics : Met     -returns to baseline functional status: Met     -safe disposition plan has been identified : Met     Nurse to notify provider when observation goals have been met and patient is ready for discharge.

## 2021-09-19 NOTE — PROGRESS NOTES
[facility]    Hospitalist Progress Note  Interval History   Doing much better, no complaints at all, liver test has improved tremendously.    All other review of systems are negative.    Assessment & Plan   Arlet Palacios is a 45 year old female who was admitted on 9/15/2021.  GI consulted for elevated liver enzyme.    Problem: Ischemic liver injury/rhabdomyolysis: Improved  -Can be discharged from GI standpoint to follow-up as outpatient within a week or 2  -Advised patient to continue large overall fluid intake given CK still slightly elevated from rhabdomyolysis  -Patient complaining about dizziness few months before syncope episode with family history of MS and POTS, recommend outpatient neurology follow-up    33619 level 2 follow up    Code Status: Full Code    -Data reviewed today: I reviewed all new labs and imaging results over the last 24 hours.     Physical Exam   Temp: 98  F (36.7  C) Temp src: Oral BP: (!) 158/94 Pulse: (!) 45   Resp: 18 SpO2: 99 % O2 Device: None (Room air)    Vitals:    09/15/21 2029 09/16/21 1150 09/19/21 0719   Weight: 59 kg (130 lb) 61.7 kg (136 lb) 61.2 kg (135 lb)     Vital Signs with Ranges  Temp:  [98  F (36.7  C)-98.5  F (36.9  C)] 98  F (36.7  C)  Pulse:  [45-48] 45  Resp:  [17-18] 18  BP: (146-165)/(88-95) 158/94  SpO2:  [97 %-100 %] 99 %  I/O last 3 completed shifts:  In: 240 [P.O.:240]  Out: -     Constitutional: Awake, alert, cooperative, no apparent distress  Respiratory: Clear to auscultation bilaterally, no crackles or wheezing  Cardiovascular: Regular rate and rhythm, normal S1 and S2, and no murmur noted  GI: Normal bowel sounds, soft, non-distended, non-tender  Skin/Integumen: No rashes, no cyanosis, no edema  Other:     Guadalupe Del Valle MD  (Jazmyn  [facility]  Office: [phone]  Cell: [phone], please feel free to call the cell    Medications       levothyroxine  75 mcg Oral Daily     sodium chloride  (PF)  3 mL Intracatheter Q8H       Data   Recent Labs   Lab 09/19/21  0633 09/18/21  1517 09/18/21  0622 09/17/21  1008 09/17/21  1008 09/16/21  1042 09/16/21  0728 09/16/21  0728 09/15/21  2041 09/15/21  2041   WBC 5.5 6.2 5.1   < > 7.7  --    < > 11.6*   < > 8.2   HGB 12.3 13.6 13.0   < > 12.9  --    < > 12.9   < > 14.4   MCV 95 97 97   < > 98  --    < > 97   < > 103*   * 145* 120*   < > 129*  --    < > 151   < > 234   INR  --   --  1.41*  --   --   --   --   --   --   --      --  139  --  140  --    < > 141   < > 144   POTASSIUM 3.8  --  4.1  --  3.8  --    < > 3.4   < > 3.5   CHLORIDE 109  --  109  --  111*  --    < > 111*   < > 108   CO2 27  --  27  --  25  --    < > 21   < > 14*   BUN 10  --  12  --  16  --    < > 26   < > 27   CR 0.76  --  0.93  --  0.99  --    < > 1.25*   < > 1.33*   ANIONGAP 5  --  3  --  4  --    < > 9   < > 22*   CHINO 8.3*  --  8.1*  --  8.3*  --    < > 7.6*   < > 9.5   GLC 90  --  74  --  88  --    < > 80   < > 164*   ALBUMIN 2.7*  --  2.9*   < > 3.0*  3.0*  --    < > 3.2*   < > 4.1   PROTTOTAL 5.2*  --  5.6*   < > 5.7*  5.7*  --    < > 6.1*   < > 7.9   BILITOTAL 1.8*  --  1.5*   < > 1.3  1.7*  --    < > 0.8   < > 0.6   ALKPHOS 69  --  75   < > 79  76  --    < > 78   < > 113   ALT 1,864*  --  2,960*   < > 2,546*  2,470*  --    < > 106*   < > 79*   AST 1,397*  --  4,079*   < > 4,195*  3,928*  --    < > 180*   < > 142*   TROPONIN  --   --   --   --   --  0.382*  --  0.508*  --  0.165*    < > = values in this interval not displayed.       No results found for this or any previous visit (from the past 24 hour(s)).     Guadalupe Del Valle MD  (Brant)  [facility]  Office: [phone]  Cell: [phone]

## 2021-09-19 NOTE — PROGRESS NOTES
Observation goals PRIOR TO DISCHARGE    Comments: -diagnostic tests and consults completed and resulted -Partiallyt met  -vital signs normal or at patient baseline -Partially met  -tolerating oral intake to maintain hydration -Met  -adequate pain control on oral analgesics -Met  -returns to baseline functional status -Met  -safe disposition plan has been identified - Not met  Nurse to notify provider when observation goals have been met and patient is ready for discharge.

## 2021-09-19 NOTE — DISCHARGE SUMMARY
[facility]  Hospitalist Discharge Summary      Date of Admission:  9/15/2021  Date of Discharge:  9/19/2021  Discharging Provider: Ziyad Rasmussen MD, MD      Discharge Diagnoses     Syncope    Type 2 Demand Myocardial ischemia  Sinus bradycardia at night    Acute kidney injury    Significant transaminitis: shock liver    Thrombocytopenia:     Psychosis, unspecified psychosis type (H)    Agitation     Lactic acidosis    Metabolic acidosis     Hypothyroidism (Known to have Hashimotos)       Follow-ups Needed After Discharge   Follow-up Appointments     Follow-up and recommended labs and tests       Follow up with primary care provider, [facility],   within 7 days for hospital follow- up.  The following labs/tests are   recommended: CBC, CMP. Can consider referral to Neurology.  - Repeat TFT and adjust Levothyroxine as appropriate      Follow up with GI as recommended  Follow up with cardiology in 2 weeks after Ziopatch             Unresulted Labs Ordered in the Past 30 Days of this Admission     Date and Time Order Name Status Description    9/18/2021  3:16 PM Bld morphology pathology review In process     9/18/2021  1:12 PM Lyme Disease Martina with reflex to WB Serum In process     9/18/2021  8:53 AM Rheumatoid factor In process     9/18/2021  8:53 AM Anti Nuclear Martina IgG by IFA with Reflex In process     9/18/2021  8:53 AM Hepatitis B core antibody In process     9/18/2021  8:53 AM Hepatitis B Surface Antibody In process     9/18/2021  8:53 AM Hepatitis B surface antigen In process     9/18/2021  8:53 AM Hepatits C antibody In process     9/18/2021  8:53 AM Hepatitis A antibody IgM In process       These results will be followed up by GI/ PMD    Discharge Disposition   Discharged to home  Condition at discharge: Stable    Hospital Course   Arlet Palacios is a 45 year old female admitted on 9/15/2021. She presents with syncope.        Syncope    Type 2 Demand  Myocardial ischemia  Sinus bradycardia at night    Assessment: Presents with an episode of syncope following a 4 mile race.  She denies any chest pain or shortness of breath prior to her syncope.  She does endorse feeling lightheaded over the past several days.  She is chest pain-free, and EKG on admission showed no dynamic ST changes to suggest ACS.  Troponin is mildly elevated 0.165, likely etiology.  Possibly an NSTEMI/type II demand ischemia.     Plan:   -Appreciate Cardiology review.  stress test reviewed and negative for ischemia. Plan for 7 day ZIO patch monitor for additional monitoring to exclude underlying arrhythmia.  Follow up with cardiology VIRAJ to be arranged.  Follow-up Lyme panel     Acute kidney injury-resolved  Assessment/Plan: Creatinine elevated on admission at 1.33, suspect secondary to prerenal etiology given her poor p.o. intake . Resolved with IV fluids     Significant transaminitis: Likely shock liver  -ultrasound reviewed and was reviewed by GI consult. Improved with IVF. She was advised to follow up in GI clinic in 1 week with recheck LFT and Follow-up hepatitis serology.     Thrombocytopenia: Likely related to acute liver injury  - recheck with PMD       Psychosis, unspecified psychosis type (H)    Agitation    Assessment/Plan:   She denies any history of hallucinations/suicidal ideations.   - resolved      Lactic acidosis    Metabolic acidosis    Assessment/Plan: Lactic acid of 2.6 on admission, suspect is secondary to hypovolemia.  Otherwise there is no objective evidence of infection driving her acidosis.       Hypothyroidism (Known to have Hashimotos)     Assessment/Plan: TSH on admission of 20.28,  Ct PTA Synthroid 75 mcg . Follow-up as an outpatient   Thyroid Studies   Recent Labs   Lab Test 09/15/21  2041   TSH 28.28*   T4 0.71*     Consultations This Hospital Stay   CARDIOLOGY IP CONSULT  GASTROENTEROLOGY IP CONSULT  PSYCHIATRY IP CONSULT    Code Status   Full Code    Time Spent  on this Encounter   I, Ziyad Rasmussen MD, MD, personally saw the patient today and spent greater than 30 minutes discharging this patient.       Ziyad Rasmussen MD, MD  [facility] OBSERVATION  [address] [address]  Phone: [phone]  ______________________________________________________________________    Physical Exam   Vital Signs: Temp: 98  F (36.7  C) Temp src: Oral BP: (!) 158/94 Pulse: (!) 45   Resp: 18 SpO2: 99 % O2 Device: None (Room air)    Weight: 135 lbs 0 oz  Gen- pleasant lying in bed  HEENT- NAD, DENISE  Neck- supple, no JVD elevation, no thyromegaly  CVS- I+II+ no m/r/g  RS- CTAB  Abdo- soft, no tenderness . No g/r/r  Ext- no edema   CNS- no focal signs   Primary Care Physician   [facility]    Discharge Orders      Comprehensive metabolic panel     Reason for your hospital stay    Arlet Palacios is a 45 year old female admitted on 9/15/2021. She presented with syncope vs presnsyncope, KYLAH, trop leak and Shock liver     Activity    Your activity upon discharge: activity as tolerated     When to contact your care team    Call your primary doctor if you have any of the following: temperature greater than 100.4 or less than 96,  increased shortness of breath, any dizziness, or chest pain.     Follow-up and recommended labs and tests     Follow up with primary care provider, [facility], within 7 days for hospital follow- up.  The following labs/tests are recommended: CBC, CMP. Can consider referral to Neurology.  - Repeat TFT and adjust Levothyroxine as appropriate      Follow up with GI as recommended  Follow up with cardiology in 2 weeks after Ziopatch     Leadless EKG Monitor 3 to 7 Days     Diet    Follow this diet upon discharge: Orders Placed This Encounter      Regular Diet Adult       Significant Results and Procedures   Results for orders placed or performed during the hospital encounter of 09/15/21    Head CT w/o contrast    Narrative    EXAM: CT HEAD W/O CONTRAST  LOCATION: [facility]  DATE/TIME: 9/15/2021 9:50 PM    INDICATION: Psychosis  COMPARISON: None.  TECHNIQUE: Routine CT Head without IV contrast. Multiplanar reformats. Dose reduction techniques were used.    FINDINGS:  INTRACRANIAL CONTENTS: No intracranial hemorrhage, extraaxial collection, or mass effect.  No CT evidence of acute infarct. Normal parenchymal attenuation. Normal ventricles and sulci.     VISUALIZED ORBITS/SINUSES/MASTOIDS: No intraorbital abnormality. No paranasal sinus mucosal disease. No middle ear or mastoid effusion.    BONES/SOFT TISSUES: No acute abnormality.      Impression    IMPRESSION:  1.  Normal head CT.   XR Chest Port 1 View    Narrative    EXAM: XR CHEST PORT 1 VIEW  LOCATION: [facility]  DATE/TIME: 9/15/2021 10:18 PM    INDICATION: Elevated troponin, transient hypertension, altered  COMPARISON: None.      Impression    IMPRESSION: Negative chest.   US Abdomen Limited    Narrative    ULTRASOUND ABDOMEN LIMITED September 18, 2021 7:45 AM    CLINICAL HISTORY: Transaminitis.    TECHNIQUE: Limited abdominal ultrasound.    COMPARISON: None.    FINDINGS:  GALLBLADDER: No shadowing gallstones are identified. There is a focal  area of gallbladder wall thickening near the gallbladder neck,  measuring approximately 0.8 cm, however the gallbladder is otherwise  unremarkable. No pericholecystic fluid is identified.    BILE DUCTS: There is no biliary dilatation. The common duct measures 2  mm. Portions of the common duct could not be visualized due to  overlying bowel gas.    LIVER: Unremarkable. No evidence for fatty infiltration of the liver.  No focal hepatic masses.    RIGHT KIDNEY: Unremarkable. No hydronephrosis.    PANCREAS: The visualized portions of the pancreas are normal.    Trace amount of ascites in the right upper quadrant.      Impression    IMPRESSION:  1.  Focal  area of gallbladder wall thickening is noted near the  gallbladder neck, measuring approximately 0.8 cm. This finding is of  uncertain clinical significance. Gallbladder neoplasm is considered  unlikely from this appearance, however a follow-up ultrasound in three  to six months is recommended to ensure resolution.  2.  No other gallbladder abnormalities are identified. No biliary  dilatation.  3.  Trace amount of ascites in the right upper quadrant.    LISA MELGAR MD         SYSTEM ID:  [id number]   Echocardiogram Complete     Value    LVEF  55-60%    Narrative    [id number]  [id number]  [id number]  [id number]^CAMERON^TANIA     [facility]  [facility]  Echocardiography Laboratory  [address]  [address]  [place] [address]  Phone [phone]  Fax [phone]     Name: TRUONG BARR  MRN: [id number]  : 1975  Study Date: 2021 10:39 AM  Age: 45 yrs  Gender: Female  Patient Location: [facility]  Reason For Study: Chest Discomfort  Ordering Physician: TANIA BARNES  Performed By: Kristina Yip     BSA: 1.6 m2  Height: 65 in  Weight: 130 lb  HR: 57  BP: 107/69 mmHg  ______________________________________________________________________________  Procedure  Complete Echo Adult.  ______________________________________________________________________________  Interpretation Summary     The visual ejection fraction is 55-60%.  Left ventricular systolic function is normal.  The study was technically difficult.  ______________________________________________________________________________  Left Ventricle  The left ventricle is normal in size. There is normal left ventricular wall  thickness. Diastolic Doppler findings (E/E' ratio and/or other parameters)  suggest left ventricular filling pressures are normal. Left ventricular  systolic function is normal. The visual ejection fraction is 55-60%.     Right Ventricle  The right ventricle is normal in size and  function.     Atria  Normal left atrial size. Right atrial size is normal. There is no color  Doppler evidence of an atrial shunt.     Mitral Valve  There is trace mitral regurgitation.     Tricuspid Valve  There is trace tricuspid regurgitation.     Aortic Valve  The aortic valve is trileaflet. No aortic regurgitation is present. No  hemodynamically significant valvular aortic stenosis.     Pulmonic Valve  There is no pulmonic valvular regurgitation. Normal pulmonic valve velocity.     Vessels  IVC diameter <2.1 cm collapsing >50% with sniff suggests a normal RA pressure  of 3 mmHg.     Pericardium  There is no pericardial effusion.     Rhythm  The rhythm was sinus bradycardia.     ______________________________________________________________________________  MMode/2D Measurements & Calculations  IVSd: 0.64 cm  LVIDd: 5.0 cm  LVIDs: 2.7 cm  LVPWd: 0.80 cm  FS: 46.0 %  LV mass(C)d: 118.2 grams  LV mass(C)dI: 71.8 grams/m2     Ao root diam: 3.0 cm  LA dimension: 2.2 cm  asc Aorta Diam: 3.1 cm  LA/Ao: 0.74  LA Volume (BP): 12.3 ml  LA Volume Index (BP): 7.5 ml/m2  RWT: 0.32     Doppler Measurements & Calculations  MV E max kory: 72.3 cm/sec  MV A max kory: 65.0 cm/sec  MV E/A: 1.1  MV dec slope: 433.8 cm/sec2     PA acc time: 0.10 sec  E/E' av.5  Lateral E/e': 6.1  Medial E/e': 7.0     ______________________________________________________________________________  Report approved by: Elvin Marsh 2021 12:16 PM         Echo Stress Echocardiogram    Narrative    [id number]  [id number]  [id number]  [id number]^CHER^JAQUELINE^HAILEY     [facility]  Echocardiography Laboratory  [address] [place], [place] [address]     Name: TRUONG BARR  MRN: [id number]  : 1975  Study Date: 2021 07:59 AM  Age: 45 yrs  Gender: Female  Patient Location: [facility]  Reason For Study: Dizziness  Ordering Physician: JAQUELINE KWON  Referring Physician: PARK [facility]LET[facility] RERE  Performed By:  Geneva Luna RDCS     BSA: 1.7 m2  Height: 65 in  Weight: 136 lb  HR: 65  BP: 108/76 mmHg  ______________________________________________________________________________  Procedure  Stress Echo Complete.  ______________________________________________________________________________  Interpretation Summary  The patient exhibited no chest pain during exercise.  This was a normal stress echocardiogram with no evidence of stress-induced  ischemia.  The Duke treadmill score was low risk ( >5 Duke score).  ______________________________________________________________________________  Stress  The patient exercised 14:30 min.  Exercise was stopped due to fatigue.  There was a normal BP response to exercise.  The patient exhibited no chest pain during exercise.  A treadmill exercise test according to the Jaya protocol was performed.  Target Heart Rate was achieved.  There was no chest pain or significant ST changes with exercise.  The Duke treadmill score was low risk ( >5 Duke score).  This was a normal stress EKG with no evidence of stress-induced ischemia.  This was a normal stress echocardiogram with no evidence of stress-induced  ischemia.     Baseline  The patient is in normal sinus rhythm.  Normal left ventricular function and wall motion at rest and post-stress.     Stress Results         Protocol:  Jaya Protocol        Maximum Predicted HR:   174 bpm         Target HR: 148 bpm               % Maximum Predicted HR: 94 %              Duration  Heart    Stage   (mm:ss)   Rate     BP                    Comment                     (bpm)   Stage 1   3:00      93    126/70   Stage 2   3:00     117    144/76   Stage 3   3:00     136    160/92   Stage 4   3:00     148    158/84   Stage 5   2:30     164    160/82RPP: 26,240; FAC: Above Average; Chua Score:                                   14  RECOVERYR  6:00      80    128/70             Stress Duration:   14:30 mm:ss        Recovery Time: 6:00 mm:ss           Maximum Stress HR: 164 bpm            METS:          17     ______________________________________________________________________________  Report approved by: Elvin Warren 09/17/2021 10:30 AM     ______________________________________________________________________________        [facility] Labs   Lab 09/19/21  0633 09/18/21  0622 09/17/21  1008 09/16/21  0728    139 140 141   POTASSIUM 3.8 4.1 3.8 3.4   CHLORIDE 109 109 111* 111*   CHINO 8.3* 8.1* 8.3* 7.6*   CO2 27 27 25 21   BUN 10 12 16 26   CR 0.76 0.93 0.99 1.25*   GLC 90 74 88 80     CBC  [facility] Labs   Lab 09/19/21 0633 09/18/21  1517 09/18/21  1517 09/18/21  0622 09/18/21  0622 09/17/21  1008 09/17/21  1008   WBC 5.5  --  6.2  --  5.1  --  7.7   RBC 3.70*  --  4.09  --  3.96  --  3.93   HGB 12.3   < > 13.6   < > 13.0   < > 12.9   HCT 35.1  --  39.5  --  38.5  --  38.5   MCV 95  --  97  --  97  --  98   MCH 33.2*  --  33.3*  --  32.8  --  32.8   MCHC 35.0  --  34.4  --  33.8  --  33.5   RDW 13.1  --  13.2  --  13.3  --  13.6   *  --  145*  --  120*  --  129*    < > = values in this interval not displayed.     INR  Recent Labs   Lab 09/18/21  0622   INR 1.41*     LFTs  Recent Labs   Lab 09/19/21  0633 09/18/21  0622 09/17/21  1008 09/16/21  0728   ALKPHOS 69 75 79  76 78   AST 1,397* 4,079* 4,195*  3,928* 180*   ALT 1,864* 2,960* 2,546*  2,470* 106*   BILITOTAL 1.8* 1.5* 1.3  1.7* 0.8   PROTTOTAL 5.2* 5.6* 5.7*  5.7* 6.1*   ALBUMIN 2.7* 2.9* 3.0*  3.0* 3.2*      Hepatitis B Testing No lab results found.  Hepatitis C Testing   No results found for: HCVAB, HQTG, HCGENO, HCPCR, HQTRNA, HEPRNA    Discharge Medications   Current Discharge Medication List      CONTINUE these medications which have NOT CHANGED    Details   calcium-magnesium (CALMAG) 500-250 MG TABS per tablet Take 1 tablet by mouth every evening      fish oil-omega-3 fatty acids 1000 MG capsule Take 2 g by mouth daily      levothyroxine (SYNTHROID/LEVOTHROID) 75 MCG tablet  Take 75 mcg by mouth daily      progesterone (PROMETRIUM) 100 MG capsule Take 100 mg by mouth every evening      Salicylic Acid (COMPOUND W EX) Compounded testosterone 4g behind the knees every morning.      vitamin B complex with vitamin C (VITAMIN  B COMPLEX) tablet Take 1 tablet by mouth daily      losartan (COZAAR) 25 MG tablet Take 25 mg by mouth daily           Allergies   Allergies   Allergen Reactions     Flu Virus Vaccine Anaphylaxis     Contrast Dye      PN: LW CM1: CONTRAST- nka Reaction :     Other Environmental Allergy      PN: LW Other1: -nka

## 2021-09-20 ENCOUNTER — TELEPHONE (OUTPATIENT)
Dept: CARDIOLOGY | Facility: CLINIC | Age: 46
End: 2021-09-20

## 2021-09-20 LAB
B BURGDOR IGG+IGM SER QL: 0.05
HAV IGM SERPL QL IA: NONREACTIVE
HBV CORE AB SERPL QL IA: NONREACTIVE
HBV SURFACE AB SERPL IA-ACNC: 0 M[IU]/ML
HBV SURFACE AG SERPL QL IA: NONREACTIVE
HCV AB SERPL QL IA: NONREACTIVE
RHEUMATOID FACT SER NEPH-ACNC: <7 IU/ML

## 2021-09-20 NOTE — TELEPHONE ENCOUNTER
Called pt - left voice message.  Post hospital - wearing Ziopatch monitor.  Has follow up with Ed RAYGOZA 10/18/2021 [place] office.

## 2021-09-20 NOTE — TELEPHONE ENCOUNTER
Lyme panel results 9/20/2021 noted. Ordered during admission for NSTEMI and syncopal event. Discharged 9/19/2021.  Patient to discuss ziopatch results with VIRAJ Ankit Kan on 10/18/2021.    Component      Latest Ref Rng & Units 9/18/2021   Lyme Disease Antibodies Serum      <0.90 0.05     Called patient with update of normal result.    Will message Dr. Burt to review

## 2021-09-21 LAB
ANA PAT SER IF-IMP: ABNORMAL
ANA PAT SER IF-IMP: ABNORMAL
ANA SER QL IF: POSITIVE
ANA TITR SER IF: ABNORMAL {TITER}
ANA TITR SER IF: ABNORMAL {TITER}

## 2021-09-22 LAB
PATH REPORT.COMMENTS IMP SPEC: NORMAL
PATH REPORT.FINAL DX SPEC: NORMAL
PATH REPORT.MICROSCOPIC SPEC OTHER STN: NORMAL
PATH REPORT.MICROSCOPIC SPEC OTHER STN: NORMAL
PATH REPORT.RELEVANT HX SPEC: NORMAL

## 2021-09-22 PROCEDURE — 85060 BLOOD SMEAR INTERPRETATION: CPT | Performed by: PATHOLOGY

## 2021-10-16 ENCOUNTER — HEALTH MAINTENANCE LETTER (OUTPATIENT)
Age: 46
End: 2021-10-16

## 2021-10-17 NOTE — PATIENT INSTRUCTIONS
Thank you for your visit with the [facility] today.    Today's plan:   1. Your heart monitor findings looked good without any concerning rhythm issues.   2. You can follow up with Cardiology on an as needed basis going forward if you have questions or concerns that come up.    Please do not hesitate to call my nurse, Mary, at [phone].     Scheduling phone number: [phone]    It was a pleasure seeing you today!     GLYNN Cano, CNP  Nurse Practitioner  [facility]  October 18, 2021  ________________________________________________________

## 2021-10-17 NOTE — PROGRESS NOTES
"  Cardiology Clinic Progress Note    Service Date: October 18, 2021    Primary Cardiologist: Dr. Zhong      Reason for Visit: Hospital follow up    HPI:   I had the pleasure of meeting Ms. Nice \"Suzan\" Emilia Palacios in the clinic today. She is a very pleasant 46 year old female with a past medical history notable for Hashimoto's thyroiditis, IBS, and asthma. She was recently admitted to [facility] on 09/15/21 following an episode of altered mental status and collapse at the end of a 4-mile trail run. In the ED, ECG was unremarkable. Renal function demonstrated mild KYLAH with creatinine at 1.33. Troponin was mildly elevated to 0.5 and trended down. There were no arrhythmias on telemetry. Echocardiogram demonstrated normal LV systolic function with ejection fraction of 55-60%. Exercise stress echocardiogram was also completed and demonstrated outstanding functional capacity, and was a normal study without evidence for ischemia. The episode was felt to be secondary to dehydration as supported by her lab findings.     She was discharged with a 7-day Zio patch monitor to help rule out the possibility of arrhythmias contributing. This showed sinus rhythm/sinus bradycardia with an average heart rate of 56 bpm. No significant arrhythmias were documented.    Today, Ms. Palacios presents to the clinic in follow up of her recent hospitalization.  She is accompanied by her son Matthieu today.  She tells me that she has been feeling generally well since she has been home from the hospital. She has been checking in with her primary care provider recently for management of her blood pressure and recent finding of elevated liver enzymes. She was previously started on low-dose losartan due to her blood pressure running higher, but ultimately did not end up starting on this due to her presyncopal/syncopal episode and concern of dropping her blood pressure too low.  Her blood pressure readings have looked excellent " on her checks over the past few weeks in the 120s/70s-80s.  She is very physically active and actually works as a .  She has run several marathons and fairly frequently runs shorter races more recently. She notes that her pulse usually runs in the mid to upper 50s at baseline consistent with the Zio patch findings.  She has not had any recurrent episodes of dizziness, lightheadedness, presyncope, or syncope following her recent hospitalization. She also denies symptoms of chest pain, shortness of breath, palpitations, or heart racing.     ASSESSMENT AND PLAN:  1. Pre-syncopal/possible syncopal episode  - Suspect this was secondary to dehydration in the setting of just having completed a 4 mile trail run given the evidence of mild KYLAH upon presentation to the emergency department.  - Cardiac work-up has been unremarkable with an echocardiogram which was essentially normal and exercise stress echocardiogram with no evidence of ischemia. 7-day Zio patch monitor in follow-up showed only sinus rhythm and sinus bradycardia which seems to be the patient's baseline as she is very physically fit and runs regularly.     Thank you for the opportunity to participate in this pleasant patient's care.  We reviewed her reassuring Zio patch monitor findings in detail today. I do not feel that any further work-up or testing is needed from a cardiac standpoint at this time. I encouraged her to call the clinic if she has questions or concerns that arise in the future, and we would be happy to see her on an as-needed basis.    35 total minutes was spent today including chart review, precharting, history and exam, post visit documentation, and reviewing studies as outlined above.     GLYNN Cano, CNP   Nurse Practitioner  [facility]  Pager: [phone]  Text Page  (8am - 5pm, M-F)    Orders this Visit:  No orders of the defined types were placed in this encounter.    No orders of the defined  types were placed in this encounter.    Medications Discontinued During This Encounter   Medication Reason     losartan (COZAAR) 25 MG tablet Therapy completed     Encounter Diagnosis   Name Primary?     Pre-syncope Yes     CURRENT MEDICATIONS:  Current Outpatient Medications   Medication Sig Dispense Refill     calcium-magnesium (CALMAG) 500-250 MG TABS per tablet Take 1 tablet by mouth every evening       fish oil-omega-3 fatty acids 1000 MG capsule Take 2 g by mouth daily       levothyroxine (SYNTHROID/LEVOTHROID) 75 MCG tablet Take 75 mcg by mouth daily       progesterone (PROMETRIUM) 100 MG capsule Take 100 mg by mouth every evening       Salicylic Acid (COMPOUND W EX) Compounded testosterone 4g behind the knees every morning.       vitamin B complex with vitamin C (VITAMIN  B COMPLEX) tablet Take 1 tablet by mouth daily       ALLERGIES  Allergies   Allergen Reactions     Flu Virus Vaccine Anaphylaxis     Contrast Dye      PN: LW CM1: CONTRAST- nka Reaction :     Other Environmental Allergy      PN: LW Other1: -nka     PAST MEDICAL, SURGICAL, FAMILY HISTORY:  History was reviewed and updated as needed, see medical record.    SOCIAL HISTORY:  Social History     Socioeconomic History     Marital status:      Spouse name: Not on file     Number of children: Not on file     Years of education: Not on file     Highest education level: Not on file   Occupational History     Not on file   Tobacco Use     Smoking status: Never Smoker   Substance and Sexual Activity     Alcohol use: Yes     Comment: 4 times per week. 2 or so each time     Drug use: Not on file     Sexual activity: Not on file   Other Topics Concern     Parent/sibling w/ CABG, MI or angioplasty before 65F 55M? Not Asked   Social History Narrative     Not on file     Social Determinants of Health     Financial Resource Strain:      Difficulty of Paying Living Expenses:    Food Insecurity:      Worried About Running Out of Food in the Last Year:   "    Ran Out of Food in the Last Year:    Transportation Needs:      Lack of Transportation (Medical):      Lack of Transportation (Non-Medical):    Physical Activity:      Days of Exercise per Week:      Minutes of Exercise per Session:    Stress:      Feeling of Stress :    Social Connections:      Frequency of Communication with Friends and Family:      Frequency of Social Gatherings with Friends and Family:      Attends [employer] Services:      Active Member of Clubs or Organizations:      Attends Club or Organization Meetings:      Marital Status:    Intimate Partner Violence:      Fear of Current or Ex-Partner:      Emotionally Abused:      Physically Abused:      Sexually Abused:      Review of Systems:  Skin:  Negative     Eyes:  Positive for glasses  ENT:  Negative    Respiratory:  Negative    Cardiovascular:  Negative    Gastroenterology: Negative    Genitourinary:  not assessed    Musculoskeletal:  Negative    Neurologic:  Positive for migraine headaches  Psychiatric:  Negative    Heme/Lymph/Imm:  Negative    Endocrine:  Positive for thyroid disorder     Physical Exam:  Vitals: /86   Pulse 58   Ht 1.664 m (5' 5.5\")   Wt 60.3 kg (133 lb)   SpO2 100%   BMI 21.80 kg/m     Wt Readings from Last 4 Encounters:   10/18/21 60.3 kg (133 lb)   09/19/21 61.2 kg (135 lb)     CONSTITUTIONAL: Appears her stated age, well nourished, and in no acute distress.  HEENT: Pupils equal, round. Sclerae nonicteric.    C/V:  Regular rate and rhythm, normal S1 and S2, no S3 or S4, no murmur, rub or gallop.   RESP: Respirations are unlabored. Lungs are clear to auscultation bilaterally without wheezing, rales, or rhonchi.  EXTREM: No clubbing, cyanosis, or lower extremity edema bilaterally.   NEURO: Alert and oriented, cooperative. Gait steady. No gross focal deficits.   PSYCH: Affect appropriate. Mentation normal. Responds to questions appropriately.  SKIN: Warm and dry. No apparent rashes or bruising.     Recent Lab " Results reviewed today:  LIVER ENZYME RESULTS:  Lab Results   Component Value Date    AST 1,397 (HH) 09/19/2021    ALT 1,864 (HH) 09/19/2021     CBC RESULTS:  Lab Results   Component Value Date    WBC 5.5 09/19/2021    RBC 3.70 (L) 09/19/2021    HGB 12.3 09/19/2021    HCT 35.1 09/19/2021    MCV 95 09/19/2021    MCH 33.2 (H) 09/19/2021    MCHC 35.0 09/19/2021    RDW 13.1 09/19/2021     (L) 09/19/2021     BMP RESULTS:  Lab Results   Component Value Date     09/19/2021    POTASSIUM 3.8 09/19/2021    CHLORIDE 109 09/19/2021    CO2 27 09/19/2021    ANIONGAP 5 09/19/2021    GLC 90 09/19/2021    BUN 10 09/19/2021    CR 0.76 09/19/2021    GFRESTIMATED >90 09/19/2021    CHINO 8.3 (L) 09/19/2021      CC  Dione Zhong MD  [id number] [place] [address]    This note was completed in part using Dragon voice recognition software. Although reviewed after completion, some word and grammatical errors may occur.

## 2021-10-18 ENCOUNTER — OFFICE VISIT (OUTPATIENT)
Dept: CARDIOLOGY | Facility: CLINIC | Age: 46
End: 2021-10-18
Payer: COMMERCIAL

## 2021-10-18 VITALS
SYSTOLIC BLOOD PRESSURE: 130 MMHG | OXYGEN SATURATION: 100 % | HEIGHT: 66 IN | WEIGHT: 133 LBS | DIASTOLIC BLOOD PRESSURE: 86 MMHG | BODY MASS INDEX: 21.38 KG/M2 | HEART RATE: 58 BPM

## 2021-10-18 DIAGNOSIS — R55 PRE-SYNCOPE: Primary | ICD-10-CM

## 2021-10-18 PROCEDURE — 99203 OFFICE O/P NEW LOW 30 MIN: CPT | Performed by: NURSE PRACTITIONER

## 2021-10-18 ASSESSMENT — MIFFLIN-ST. JEOR: SCORE: 1247.09

## 2021-10-18 NOTE — LETTER
"10/18/2021    [facility]  [address]  [facility] [address]    RE: Arlet Nieto Codyashok       Dear Colleague,    I had the pleasure of seeing Arlet Palacios in the [facility].      Cardiology Clinic Progress Note    Service Date: October 18, 2021    Primary Cardiologist: Dr. Zhong      Reason for Visit: Hospital follow up    HPI:   I had the pleasure of meeting Ms. Nice \"Suzan\" Emilia Palacios in the clinic today. She is a very pleasant 46 year old female with a past medical history notable for Hashimoto's thyroiditis, IBS, and asthma. She was recently admitted to [facility] on 09/15/21 following an episode of altered mental status and collapse at the end of a 4-mile trail run. In the ED, ECG was unremarkable. Renal function demonstrated mild KYLAH with creatinine at 1.33. Troponin was mildly elevated to 0.5 and trended down. There were no arrhythmias on telemetry. Echocardiogram demonstrated normal LV systolic function with ejection fraction of 55-60%. Exercise stress echocardiogram was also completed and demonstrated outstanding functional capacity, and was a normal study without evidence for ischemia. The episode was felt to be secondary to dehydration as supported by her lab findings.     She was discharged with a 7-day Zio patch monitor to help rule out the possibility of arrhythmias contributing. This showed sinus rhythm/sinus bradycardia with an average heart rate of 56 bpm. No significant arrhythmias were documented.    Today, Ms. Palacios presents to the clinic in follow up of her recent hospitalization.  She is accompanied by her son Matthieu today.  She tells me that she has been feeling generally well since she has been home from the hospital. She has been checking in with her primary care provider recently for management of her blood pressure and recent finding of elevated liver " enzymes. She was previously started on low-dose losartan due to her blood pressure running higher, but ultimately did not end up starting on this due to her presyncopal/syncopal episode and concern of dropping her blood pressure too low.  Her blood pressure readings have looked excellent on her checks over the past few weeks in the 120s/70s-80s.  She is very physically active and actually works as a .  She has run several marathons and fairly frequently runs shorter races more recently. She notes that her pulse usually runs in the mid to upper 50s at baseline consistent with the Zio patch findings.  She has not had any recurrent episodes of dizziness, lightheadedness, presyncope, or syncope following her recent hospitalization. She also denies symptoms of chest pain, shortness of breath, palpitations, or heart racing.     ASSESSMENT AND PLAN:  1. Pre-syncopal/possible syncopal episode  - Suspect this was secondary to dehydration in the setting of just having completed a 4 mile trail run given the evidence of mild KYLAH upon presentation to the emergency department.  - Cardiac work-up has been unremarkable with an echocardiogram which was essentially normal and exercise stress echocardiogram with no evidence of ischemia. 7-day Zio patch monitor in follow-up showed only sinus rhythm and sinus bradycardia which seems to be the patient's baseline as she is very physically fit and runs regularly.     Thank you for the opportunity to participate in this pleasant patient's care.  We reviewed her reassuring Zio patch monitor findings in detail today. I do not feel that any further work-up or testing is needed from a cardiac standpoint at this time. I encouraged her to call the clinic if she has questions or concerns that arise in the future, and we would be happy to see her on an as-needed basis.    35 total minutes was spent today including chart review, precharting, history and exam, post visit  documentation, and reviewing studies as outlined above.     GLYNN Cano, CNP   Nurse Practitioner  [facility]  Pager: [phone]  Text Page  (8am - 5pm, M-F)    Orders this Visit:  No orders of the defined types were placed in this encounter.    No orders of the defined types were placed in this encounter.    Medications Discontinued During This Encounter   Medication Reason     losartan (COZAAR) 25 MG tablet Therapy completed     Encounter Diagnosis   Name Primary?     Pre-syncope Yes     CURRENT MEDICATIONS:  Current Outpatient Medications   Medication Sig Dispense Refill     calcium-magnesium (CALMAG) 500-250 MG TABS per tablet Take 1 tablet by mouth every evening       fish oil-omega-3 fatty acids 1000 MG capsule Take 2 g by mouth daily       levothyroxine (SYNTHROID/LEVOTHROID) 75 MCG tablet Take 75 mcg by mouth daily       progesterone (PROMETRIUM) 100 MG capsule Take 100 mg by mouth every evening       Salicylic Acid (COMPOUND W EX) Compounded testosterone 4g behind the knees every morning.       vitamin B complex with vitamin C (VITAMIN  B COMPLEX) tablet Take 1 tablet by mouth daily       ALLERGIES  Allergies   Allergen Reactions     Flu Virus Vaccine Anaphylaxis     Contrast Dye      PN: LW CM1: CONTRAST- nka Reaction :     Other Environmental Allergy      PN: LW Other1: -nka     PAST MEDICAL, SURGICAL, FAMILY HISTORY:  History was reviewed and updated as needed, see medical record.    SOCIAL HISTORY:  Social History     Socioeconomic History     Marital status:      Spouse name: Not on file     Number of children: Not on file     Years of education: Not on file     Highest education level: Not on file   Occupational History     Not on file   Tobacco Use     Smoking status: Never Smoker   Substance and Sexual Activity     Alcohol use: Yes     Comment: 4 times per week. 2 or so each time     Drug use: Not on file     Sexual activity: Not on file   Other Topics Concern      "Parent/sibling w/ CABG, MI or angioplasty before 65F 55M? Not Asked   Social History Narrative     Not on file     Social Determinants of Health     Financial Resource Strain:      Difficulty of Paying Living Expenses:    Food Insecurity:      Worried About Running Out of Food in the Last Year:      Ran Out of Food in the Last Year:    Transportation Needs:      Lack of Transportation (Medical):      Lack of Transportation (Non-Medical):    Physical Activity:      Days of Exercise per Week:      Minutes of Exercise per Session:    Stress:      Feeling of Stress :    Social Connections:      Frequency of Communication with Friends and Family:      Frequency of Social Gatherings with Friends and Family:      Attends Hindu Services:      Active Member of Clubs or Organizations:      Attends Club or Organization Meetings:      Marital Status:    Intimate Partner Violence:      Fear of Current or Ex-Partner:      Emotionally Abused:      Physically Abused:      Sexually Abused:      Review of Systems:  Skin:  Negative     Eyes:  Positive for glasses  ENT:  Negative    Respiratory:  Negative    Cardiovascular:  Negative    Gastroenterology: Negative    Genitourinary:  not assessed    Musculoskeletal:  Negative    Neurologic:  Positive for migraine headaches  Psychiatric:  Negative    Heme/Lymph/Imm:  Negative    Endocrine:  Positive for thyroid disorder     Physical Exam:  Vitals: /86   Pulse 58   Ht 1.664 m (5' 5.5\")   Wt 60.3 kg (133 lb)   SpO2 100%   BMI 21.80 kg/m     Wt Readings from Last 4 Encounters:   10/18/21 60.3 kg (133 lb)   09/19/21 61.2 kg (135 lb)     CONSTITUTIONAL: Appears her stated age, well nourished, and in no acute distress.  HEENT: Pupils equal, round. Sclerae nonicteric.    C/V:  Regular rate and rhythm, normal S1 and S2, no S3 or S4, no murmur, rub or gallop.   RESP: Respirations are unlabored. Lungs are clear to auscultation bilaterally without wheezing, rales, or rhonchi.  EXTREM: " No clubbing, cyanosis, or lower extremity edema bilaterally.   NEURO: Alert and oriented, cooperative. Gait steady. No gross focal deficits.   PSYCH: Affect appropriate. Mentation normal. Responds to questions appropriately.  SKIN: Warm and dry. No apparent rashes or bruising.     Recent Lab Results reviewed today:  LIVER ENZYME RESULTS:  Lab Results   Component Value Date    AST 1,397 (HH) 09/19/2021    ALT 1,864 (HH) 09/19/2021     CBC RESULTS:  Lab Results   Component Value Date    WBC 5.5 09/19/2021    RBC 3.70 (L) 09/19/2021    HGB 12.3 09/19/2021    HCT 35.1 09/19/2021    MCV 95 09/19/2021    MCH 33.2 (H) 09/19/2021    MCHC 35.0 09/19/2021    RDW 13.1 09/19/2021     (L) 09/19/2021     BMP RESULTS:  Lab Results   Component Value Date     09/19/2021    POTASSIUM 3.8 09/19/2021    CHLORIDE 109 09/19/2021    CO2 27 09/19/2021    ANIONGAP 5 09/19/2021    GLC 90 09/19/2021    BUN 10 09/19/2021    CR 0.76 09/19/2021    GFRESTIMATED >90 09/19/2021    CHINO 8.3 (L) 09/19/2021      CC  Dione Zhong MD  [address] [place] [address]    This note was completed in part using Dragon voice recognition software. Although reviewed after completion, some word and grammatical errors may occur.      Thank you for allowing me to participate in the care of your patient.      Sincerely,     Michael Kan NP     [facility]  cc:   No referring provider defined for this encounter.

## 2021-12-11 ENCOUNTER — HEALTH MAINTENANCE LETTER (OUTPATIENT)
Age: 46
End: 2021-12-11

## 2022-08-18 ENCOUNTER — TRANSFERRED RECORDS (OUTPATIENT)
Dept: HEALTH INFORMATION MANAGEMENT | Facility: CLINIC | Age: 47
End: 2022-08-18

## 2022-10-01 ENCOUNTER — HEALTH MAINTENANCE LETTER (OUTPATIENT)
Age: 47
End: 2022-10-01

## 2023-02-04 ENCOUNTER — HEALTH MAINTENANCE LETTER (OUTPATIENT)
Age: 48
End: 2023-02-04

## 2023-06-02 ENCOUNTER — TRANSFERRED RECORDS (OUTPATIENT)
Dept: HEALTH INFORMATION MANAGEMENT | Facility: CLINIC | Age: 48
End: 2023-06-02
Payer: COMMERCIAL

## 2023-09-05 LAB
CREATININE (EXTERNAL): 0.66 MG/DL (ref 0.52–1.04)
TSH SERPL-ACNC: 1.98 UIU/ML (ref 0.47–4.68)

## 2023-09-11 ENCOUNTER — TRANSFERRED RECORDS (OUTPATIENT)
Dept: HEALTH INFORMATION MANAGEMENT | Facility: CLINIC | Age: 48
End: 2023-09-11
Payer: COMMERCIAL

## 2024-03-03 ENCOUNTER — HEALTH MAINTENANCE LETTER (OUTPATIENT)
Age: 49
End: 2024-03-03